# Patient Record
Sex: FEMALE | Race: WHITE | Employment: PART TIME | URBAN - METROPOLITAN AREA
[De-identification: names, ages, dates, MRNs, and addresses within clinical notes are randomized per-mention and may not be internally consistent; named-entity substitution may affect disease eponyms.]

---

## 2017-07-07 LAB
ANTIBODY SCREEN, EXTERNAL: NEGATIVE
CHLAMYDIA, EXTERNAL: NEGATIVE
HBSAG, EXTERNAL: NEGATIVE
HCT, EXTERNAL: 36.3
HGB, EXTERNAL: 12.6
HIV, EXTERNAL: NEGATIVE
N. GONORRHEA, EXTERNAL: NEGATIVE
PLATELET CNT,   EXTERNAL: 261
RPR, EXTERNAL: NORMAL
RUBELLA, EXTERNAL: NORMAL
TSH SERPL-ACNC: 0.64 M[IU]/L
TYPE, ABO & RH, EXTERNAL: NORMAL

## 2017-09-07 ENCOUNTER — DOCUMENTATION ONLY (OUTPATIENT)
Dept: OBGYN CLINIC | Age: 22
End: 2017-09-07

## 2017-09-07 NOTE — PROGRESS NOTES
Prenatal records reviewed. LMP=4/26/17 -> ELVIA=1/31/18 **  US (7/7/17) 9+6 @ 10+2    H/o +chlamydia (2014) -> GC neg 7/7/17  Declined CF and aneuploidy screening/AFP  Per notes, pap done, no results included.

## 2017-09-08 ENCOUNTER — OFFICE VISIT (OUTPATIENT)
Dept: OBGYN CLINIC | Age: 22
End: 2017-09-08

## 2017-09-08 VITALS
HEIGHT: 66 IN | DIASTOLIC BLOOD PRESSURE: 73 MMHG | HEART RATE: 82 BPM | SYSTOLIC BLOOD PRESSURE: 109 MMHG | WEIGHT: 208 LBS | BODY MASS INDEX: 33.43 KG/M2

## 2017-09-08 DIAGNOSIS — Z34.82 ENCOUNTER FOR SUPERVISION OF OTHER NORMAL PREGNANCY IN SECOND TRIMESTER: Primary | ICD-10-CM

## 2017-09-08 PROBLEM — Z34.90 PREGNANCY: Status: ACTIVE | Noted: 2017-09-08

## 2017-09-08 LAB — URINALYSIS, EXTERNAL: NEGATIVE

## 2017-09-08 RX ORDER — PRENATAL VIT 96/IRON FUM/FOLIC 27MG-0.8MG
1 TABLET ORAL
COMMUNITY
End: 2018-07-09

## 2017-09-08 NOTE — PATIENT INSTRUCTIONS

## 2017-09-08 NOTE — PROGRESS NOTES
Current pregnancy history: (TRANSFER OB - NEW PATIENT)     Abdoulaye Dixon is a ,  19 y.o. female [facility] Patient's last menstrual period was 2017. Transfer OB @ 19wks. Moved back to [place] from [facility]. ELVIA=18 by LMP, confirmed by Calvin Stuart      Prenatal records reviewed.     LMP=17 -> ELVIA=18 **  US (17) 9+6 @ 10+2     H/o +chlamydia () -> GC neg 17  Declined CF and aneuploidy screening/AFP  Per notes, pap done, no results included. No urine culture results    Declined NT/QUAD/CF/Flu Vaccine. She presents for the evaluation of amenorrhea and a positive pregnancy test.    LMP history:  The date of her LMP is certain. Her last menstrual period was normal and lasted for 4 to 5 days. A urine pregnancy test was positive several months ago. She was not on the pill at conception. Based on her LMP, her EDC is 18. Her menstrual cycles are regular and occur approximately every 28 days  and range from 3 to 5 days. The last menses lasted 4-5 the usual number of days. FETAL SURVEY - FHT= 130  A SINGLE VIABLE IUP AT 19W2D [place] BY LMP. FETAL CARDIAC MOTION OBSERVED. FETAL ANATOMY VISUALIZED AND APPEARS WITHIN NORMAL LIMITS. NO ABNORMALITIES IDENTIFIED ON TODAYS EXAM.  ANATOMY NOT SEEN: 4CH, 3VV, RVOT, LVOT. FETAL POSITION IS BREECH AND PRONE. FOLLOW UP  NEEDED TO COMPLETE SURVEY. APPROPRIATE GROWTH MEASURED; SIZE = DATES. KULWANT, CERVIX AND PLACENTA APPEAR WITHIN NORMAL LIMITS. GENDER: NOT SEEN    Ultrasound data:  She had an  ultrasound done by the ultrasound tech w/previous OB in [place], which revealed a viable pro pregnancy, giving an EDC of 18. Pregnancy symptoms:    Since her LMP she has experienced  urinary frequency, breast tenderness, and nausea. She has not been vomiting over the last few weeks. Associated signs and symptoms which she denies: dysuria, discharge, vaginal bleeding.     She states she has gained weight: Approximately 2 pounds over the last few weeks. Baseline = 206    Relevant past pregnancy history:   She has the following pregnancy history: PRIMA    She has no history of  delivery. Relevant past medical history:(relevant to this pregnancy): noncontributory. 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 Substance history: negative for alcohol, tobacco and street drugs. Positive for nothing. Exposure history: There are no indoor cats in the home. The patient was instructed to not change the cat litter. She denies close contact with children on a regular basis. She is unsure if she has had chicken pox or the vaccine in the past.   Patient denies issues with domestic violence. Genetic Screening/Teratology Counseling: (Includes patient, baby's father, or anyone in either family with:)  3.  Patient's age >/= 28 at [facility]?-- no  .   2. Thalassemia ([facility], [facility], [facility], or  background): MCV<80?--no.     3.  Neural tube defect (meningomyelocele, spina bifida, anencephaly)?--no.   4.  Congenital heart defect?--no.  5.  Down syndrome?--no.   6.  Jorge-Sachs (Hindu, [place])?--no.   7.  Canavan's Disease?--no.   8.  Familial Dysautonomia?--no.   9.  Sickle cell disease or trait ()? --no   The patient has not been tested for sickle trait  10. Hemophilia or other blood disorders?--no. 11.  Muscular dystrophy?--no. 12.  Cystic fibrosis?--no. 13.  Pinellas's Chorea?--no. 14.  Mental retardation/autism (if yes was person tested for Fragile X)?--no. 15.  Other inherited genetic or chromosomal disorder?--no. 12.  Maternal metabolic disorder (DM, PKU, etc)?--no. 17.  Patient or FOB with a child with a birth defect not listed above?--no.  17a. Patient or FOB with a birth defect themselves?--no. 18.  Recurrent pregnancy loss, or stillbirth?--no.   19. Any medications since LMP other than prenatal vitamins (include vitamins, supplements, OTC meds, drugs, alcohol)?--no. 20.  Any other genetic/environmental exposure to discuss?--no. Infection History:  1. Lives with someone with TB or TB exposed?--no.   2.  Patient or partner has history of genital herpes?--no.  3.  Rash or viral illness since LMP?--no.    4.  History of STD (GC, CT, HPV, syphilis, HIV)?-- h/o chlamydia (), neg 2017.  5. Other: OTHER? Past Medical History:   Diagnosis Date    Hx of chlamydia infection 2014    Routine Papanicolaou smear 2017    Pending report from previous OB-GYN     History reviewed. No pertinent surgical history. Social History     Occupational History    Not on file. Social History Main Topics    Smoking status: Never Smoker    Smokeless tobacco: Never Used      Comment: Never used vapor or e-cigs     Alcohol use No    Drug use: No    Sexual activity: Not Currently     Partners: Male     Birth control/ protection: None     Family History   Problem Relation Age of Onset    Hypertension Mother     Breast Cancer Maternal Grandmother 54    Cancer Maternal Grandmother      Colon    Breast Cancer Paternal Grandmother 72    Cancer Paternal Grandfather 79     Liver     OB History    Para Term  AB Living   1        SAB TAB Ectopic Molar Multiple Live Births              # Outcome Date [place] Lbr Matteo/2nd Weight Sex Delivery Anes PTL Lv   1 Current                 No Known Allergies  Prior to Admission medications    Medication Sig Start Date End Date Taking? Authorizing Provider   PRENATAL [id number]/KEIKO FUM/FOLIC (PRENATAL VITAMIN) 27 mg iron- 800 mcg tab tablet Take 1 Tab by mouth.    Yes Historical Provider        Review of Systems: History obtained from the patient  Constitutional: negative for weight loss, fever, night sweats  HEENT: negative for hearing loss, earache, congestion, snoring, sore throat  CV: negative for chest pain, palpitations, edema  Resp: negative for cough, shortness of breath, wheezing  Breast: negative for breast lumps, nipple discharge, galactorrhea  GI: negative for change in bowel habits, abdominal pain, black or bloody stools  : negative for frequency, dysuria, hematuria, vaginal discharge  MSK: negative for back pain, joint pain, muscle pain  Skin: negative for itching, rash, hives  Neuro: negative for dizziness, headache, confusion, weakness  Psych: negative for anxiety, depression, change in mood  Heme/lymph: negative for bleeding, bruising, pallor    Objective:  Visit Vitals    /73    Pulse 82    Ht 5' 6\" (1.676 m)    Wt 208 lb (94.3 kg)    LMP 04/26/2017    BMI 33.57 kg/m2       Physical Exam:     Constitutional  · Appearance: well-nourished, well developed, alert, in no acute distress    HENT  · Head  · Face: appears normal  · Eyes: appear normal  · Ears: normal  · Mouth: normal  · Lips: no lesions    Neck  · Inspection/Palpation: normal appearance, no masses or tenderness  · Lymph Nodes: no lymphadenopathy present  · Thyroid: gland size normal, nontender, no nodules or masses present on palpation    Chest  · Respiratory Effort: breathing unlabored  · Auscultation: normal breath sounds    Cardiovascular  · Heart:  · Auscultation: regular rate and rhythm without murmur    Breasts  · Inspection of Breasts: breasts symmetrical, no skin changes, no discharge present, nipple appearance normal, no skin retraction present  · Palpation of Breasts and Axillae: no masses present on palpation, no breast tenderness  · Axillary Lymph Nodes: no lymphadenopathy present    Gastrointestinal  · Abdominal Examination: abdomen non-tender to palpation, normal bowel sounds, no masses present  · Liver and spleen: no hepatomegaly present, spleen not palpable  · Hernias: no hernias identified    Genitourinary  · External Genitalia: normal appearance for age, no discharge present, no tenderness present, no inflammatory lesions present, no masses present, no atrophy present  · Vagina: normal vaginal vault without central or paravaginal defects, no discharge present, no inflammatory lesions present, no masses present  · Bladder: non-tender to palpation  · Urethra: appears normal  · Cervix: normal; lg/th/cl  · Uterus: gravid, FH @ U, soft, NT  · Adnexa: no adnexal tenderness present, no adnexal masses present  · Perineum: perineum within normal limits, no evidence of trauma, no rashes or skin lesions present  · Anus: anus within normal limits, no hemorrhoids present  · Inguinal Lymph Nodes: no lymphadenopathy present    Skin  · General Inspection: no rash, no lesions identified    Neurologic/Psychiatric  · Mental Status:  · Orientation: grossly oriented to person, place and time  · Mood and Affect: mood normal, affect appropriate    Assessment:   Intrauterine pregnancy:  - transfer OB @ 19wks  - ELVIA=1/31/18, confirmed by 10wk US  - declines CF, QS, QFP, aneuploidy screening  - varicella unknown. Declines titer today, will draw with 1hr/CBC  - will request pap results  - send UA/C&S today  - US today, cwd, heart not well seen. Rpt US with next visit      Plan:     · Offered CF testing, CVS, Nuchal Translucency, MSAFP, amnio, and discussed NIPT  · Course of pregnancy discussed including visit schedule, routine U/S, glucola testing, etc.  · Avoid alcoholic beverages and illicit/recreational drugs use  · Take prenatal vitamins or folic acid daily. · Hospital and practice style discussed with coverage system. · Discussed nutrition, toxoplasmosis precautions, sexual activity, exercise, need for influenza vaccine, environmental and work hazards, travel advice, screen for domestic violence, need for seat belts. · Discussed seafood, unpasteurized dairy products, deli meat, artificial sweeteners, and caffeine. · Information on prenatal classes/breastfeeding given. · Patient encouraged not to smoke.   · Discussed current prescription drug use. Given medication list.  · Discussed the use of over the counter medications and chemicals. · Pt understands risk of hemorrhage during pregnancy and post delivery and would accept blood products if necessary in life-threatening emergencies    Handouts given to pt.     Orders Placed This Encounter    CULTURE, URINE    URINALYSIS W/ RFLX MICROSCOPIC

## 2017-09-08 NOTE — MR AVS SNAPSHOT
Visit Information Date & Time Provider Department Dept. Phone Encounter #  
 9/8/2017 10:00  S Marija Mendiola, [address] [phone] [id number] Upcoming Health Maintenance Date Due  
 HPV AGE 9Y-34Y (1 of 3 - Female 3 Dose Series) 11/17/2004 PAP AKA CERVICAL CYTOLOGY 11/17/2014 INFLUENZA AGE 9 TO ADULT 8/1/2017 Allergies as of 9/8/2017  Review Complete On: 9/8/2017 By: Elgin Holliday No Known Allergies Current Immunizations  Never Reviewed No immunizations on file. Not reviewed this visit Vitals BP Pulse Height(growth percentile) Weight(growth percentile) LMP BMI  
 109/73 82 5' 6\" (1.676 m) 208 lb (94.3 kg) 04/26/2017 33.57 kg/m2 OB Status Smoking Status Pregnant Never Smoker Vitals History BMI and BSA Data Body Mass Index Body Surface Area  
 33.57 kg/m 2 2.1 m 2 Your Updated Medication List  
  
   
This list is accurate as of: 9/8/17 10:27 AM.  Always use your most recent med list.  
  
  
  
  
 prenatal vitamin 27 mg iron- 800 mcg Tab tablet Take 1 Tab by mouth. Patient Instructions Weeks 18 to 22 of Your Pregnancy: Care Instructions Your Care Instructions Your baby is continuing to develop quickly. At this stage, babies can now suck their thumbs,  firmly with their hands, and open and close their eyelids. Sometime between 18 and 22 weeks, you will start to feel your baby move. At first, these small fetal movements feel like fluttering or \"butterflies. \" Some women say that they feel like gas bubbles. As the baby grows, these movements will become stronger. You may also notice that your baby kicks and hiccups. During this time, you may find that your nausea and fatigue are gone. Overall, you may feel better and have more energy than you did in your first trimester.  But you may also have new discomforts now, such as sleep problems or leg cramps. This care sheet can help you ease these discomforts. Follow-up care is a key part of your treatment and safety. Be sure to make and go to all appointments, and call your doctor if you are having problems. It's also a good idea to know your test results and keep a list of the medicines you take. How can you care for yourself at home? Ease sleep problems · Avoid caffeine in drinks or chocolate late in the day. · Get some exercise every day. · Take a warm shower or bath before bed. · Have a light snack or glass of milk at bedtime. · Do relaxation exercises in bed to calm your mind and body. · Support your legs and back with extra pillows. Try a pillow between your legs if you sleep on your side. · Do not use sleeping pills or alcohol. They could harm your baby. Ease leg cramps · Do not massage your calf during the cramp. · Sit on a firm bed or chair. Straighten your leg, and bend your foot (flex your ankle) slowly upward, toward your knee. Bend your toes up and down. · Stand on a cool, flat surface. Stretch your toes upward, and take small steps walking on your heels. · Use a heating pad or hot water bottle to help with muscle ache. Prevent leg cramps · Be sure to get enough calcium. If you are worried that you are not getting enough, talk to your doctor. · Exercise every day, and stretch your legs before bed. · Take a warm bath before bed, and try leg warmers at night. Where can you learn more? Go to http://ricci-so.info/. Enter M043 in the search box to learn more about \"Weeks 18 to 22 of Your Pregnancy: Care Instructions. \" Current as of: March 16, 2017 Content Version: 11.3 © 0816-3099 [employer] Institute. Care instructions adapted under license by [employer] (which disclaims liability or warranty for this information).  If you have questions about a medical condition or this instruction, always ask your healthcare professional. [address] any warranty or liability for your use of this information. Introducing \[facility] [place] [facility]\"" & [facility] SERVICES! Lex Walker introduces Rapid7 patient portal. Now you can access parts of your medical record, email your doctor's office, and request medication refills online. 1. In your internet browser, go to [email or web address] 2. Click on the First Time User? Click Here link in the Sign In box. You will see the New Member Sign Up page. 3. Enter your Rapid7 Access Code exactly as it appears below. You will not need to use this code after youve completed the sign-up process. If you do not sign up before the expiration date, you must request a new code. · Rapid7 Access Code: [email or web address] Expires: 12/7/2017 10:27 AM 
 
4. Enter the last four digits of your Social Security Number (xxxx) and Date of Birth (mm/dd/yyyy) as indicated and click Submit. You will be taken to the next sign-up page. 5. Create a Rapid7 ID. This will be your Rapid7 login ID and cannot be changed, so think of one that is secure and easy to remember. 6. Create a Rapid7 password. You can change your password at any time. 7. Enter your Password Reset Question and Answer. This can be used at a later time if you forget your password. 8. Enter your e-mail address. You will receive e-mail notification when new information is available in [address] 9. Click Sign Up. You can now view and download portions of your medical record. 10. Click the Download Summary menu link to download a portable copy of your medical information. If you have questions, please visit the Frequently Asked Questions section of the Rapid7 website. Remember, Rapid7 is NOT to be used for urgent needs. For medical emergencies, dial 911. Now available from your iPhone and Android! Please provide this summary of care documentation to your next provider. If you have any questions after today's visit, please call [phone].

## 2017-09-09 LAB
APPEARANCE UR: ABNORMAL
BACTERIA #/AREA URNS HPF: ABNORMAL /[HPF]
BACTERIA UR CULT: NORMAL
BILIRUB UR QL STRIP: NEGATIVE
CASTS URNS QL MICRO: ABNORMAL /LPF
COLOR UR: YELLOW
EPI CELLS #/AREA URNS HPF: >10 /HPF
GLUCOSE UR QL: NEGATIVE
HGB UR QL STRIP: NEGATIVE
KETONES UR QL STRIP: ABNORMAL
LEUKOCYTE ESTERASE UR QL STRIP: ABNORMAL
MICRO URNS: ABNORMAL
MUCOUS THREADS URNS QL MICRO: PRESENT
NITRITE UR QL STRIP: NEGATIVE
PH UR STRIP: 6.5 [PH] (ref 5–7.5)
PROT UR QL STRIP: ABNORMAL
RBC #/AREA URNS HPF: ABNORMAL /HPF
SP GR UR: 1.03 (ref 1–1.03)
UROBILINOGEN UR STRIP-MCNC: 0.2 MG/DL (ref 0.2–1)
WBC #/AREA URNS HPF: ABNORMAL /HPF

## 2017-10-06 ENCOUNTER — ROUTINE PRENATAL (OUTPATIENT)
Dept: OBGYN CLINIC | Age: 22
End: 2017-10-06

## 2017-10-06 VITALS
BODY MASS INDEX: 34.39 KG/M2 | SYSTOLIC BLOOD PRESSURE: 113 MMHG | HEART RATE: 78 BPM | WEIGHT: 214 LBS | HEIGHT: 66 IN | DIASTOLIC BLOOD PRESSURE: 66 MMHG

## 2017-10-06 DIAGNOSIS — R80.9 PROTEINURIA, UNSPECIFIED TYPE: ICD-10-CM

## 2017-10-06 DIAGNOSIS — Z34.82 ENCOUNTER FOR SUPERVISION OF OTHER NORMAL PREGNANCY IN SECOND TRIMESTER: Primary | ICD-10-CM

## 2017-10-06 DIAGNOSIS — Z23 ENCOUNTER FOR IMMUNIZATION: ICD-10-CM

## 2017-10-06 NOTE — PROGRESS NOTES
LIMITED OB SCAN  A SINGLE BREECH 23W2D IUP IS SEEN. FETAL CARDIAC MOTION OBSERVED. LIMITED ANATOMY WAS VISUALIZED AND APPEARS WNL. FETAL CARDIAC ANATOMY WAS SEEN WELL  AND APPEARS WNL. FETAL SURVEY WAS COMPLETED. APPROPRIATE FETAL GROWTH IS SEEN. SIZE = DATES. KULWANT AND PLACENTA APPEAR WNL. Some sciatic pain-> tylenol, handout give, declines PT - will call if wants referral. +FM. US today wnl: 22+6 @ 23+2. 576gm. Nl morph. Post placenta. Flu vacc today. RTO 4wks with 1hr/CBC.

## 2017-10-06 NOTE — MR AVS SNAPSHOT
Visit Information Date & Time Provider Department Dept. Phone Encounter #  
 10/6/2017 10:00  S Marija Mendiola, [address] [phone] [id number] Your Appointments 10/6/2017 10:00 AM  
OB VISIT with [address], [place]  
[place] ([address]) Appt Note: US 1st - Incomplete FS - DM after (MG); Rpt UA  
 [id number] [facility] [address] [place] [address] 99 1000 [address]  
  
    
 11/3/2017 10:00 AM  
OB VISIT with [address], [place]  
[place] ([address]) Appt Note: 27w2d - 1hr GCT/CBC today and  Consent (MG)  
 [address]  
[phone] Upcoming Health Maintenance Date Due  
 HPV AGE 9Y-34Y (1 of 3 - Female 3 Dose Series) 2004 PAP AKA CERVICAL CYTOLOGY 2014 INFLUENZA AGE 9 TO ADULT 2017 Allergies as of 10/6/2017  Review Complete On: 10/6/2017 By: Patricia Hargrove No Known Allergies Current Immunizations  Never Reviewed No immunizations on file. Not reviewed this visit Vitals BP Pulse Height(growth percentile) Weight(growth percentile) LMP BMI  
 113/66 78 5' 6\" (1.676 m) 214 lb (97.1 kg) 2017 34.54 kg/m2 OB Status Smoking Status Pregnant Never Smoker BMI and BSA Data Body Mass Index Body Surface Area 34.54 kg/m 2 2.13 m 2 Your Updated Medication List  
  
   
This list is accurate as of: 10/6/17  9:59 AM.  Always use your most recent med list.  
  
  
  
  
 prenatal vitamin 27 mg iron- 800 mcg Tab tablet Take 1 Tab by mouth. Patient Instructions Weeks 22 to 26 of Your Pregnancy: Care Instructions Your Care Instructions As you enter your 7th month of pregnancy at week 26, your baby's lungs are growing stronger and getting ready to breathe.  You may notice that your baby responds to the sound of your or your partner's voice. You may also notice that your baby does less turning and twisting and more squirming or jerking. Jerking often means that your baby has the hiccups. Hiccups are perfectly normal and are only temporary. You may want to think about attending a childbirth preparation class. This is also a good time to start thinking about whether you want to have pain medicine during labor. Most pregnant women are tested for gestational diabetes between weeks 25 and 28. Gestational diabetes occurs when your blood sugar level gets too high when you're pregnant. The test is important, because you can have gestational diabetes and not know it. But the condition can cause problems for your baby. Follow-up care is a key part of your treatment and safety. Be sure to make and go to all appointments, and call your doctor if you are having problems. It's also a good idea to know your test results and keep a list of the medicines you take. How can you care for yourself at home? Ease discomfort from your baby's kicking · Change your position. Sometimes this will cause your baby to change position too. · Take a deep breath while you raise your arm over your head. Then breathe out while you drop your arm. Do Kegel exercises to prevent urine from leaking · You can do Kegel exercises while you stand or sit. ¨ Squeeze the same muscles you would use to stop your urine. Your belly and thighs should not move. ¨ Hold the squeeze for 3 seconds, and then relax for 3 seconds. ¨ Start with 3 seconds. Then add 1 second each week until you are able to squeeze for 10 seconds. ¨ Repeat the exercise 10 to 15 times for each session. Do three or more sessions each day. Ease or reduce swelling in your feet, ankles, hands, and fingers · If your fingers are puffy, take off your rings. · Do not eat high-salt foods, such as potato chips. · Prop up your feet on a stool or couch as much as possible. Sleep with pillows under your feet. · Do not stand for long periods of time or wear tight shoes. · Wear support stockings. Where can you learn more? Go to http://ricci-so.info/. Enter G264 in the search box to learn more about \"Weeks 22 to 26 of Your Pregnancy: Care Instructions. \" Current as of: March 16, 2017 Content Version: 11.3 © 0078-3194 [employer]. Care instructions adapted under license by [employer]54.[employer] (which disclaims liability or warranty for this information). If you have questions about a medical condition or this instruction, always ask your healthcare professional. [address] any warranty or liability for your use of this information. Introducing [facility] & HEALTH SERVICES! Lauar Pinedo introduces Snackr patient portal. Now you can access parts of your medical record, email your doctor's office, and request medication refills online. 1. In your internet browser, go to [email or web address] 2. Click on the First Time User? Click Here link in the Sign In box. You will see the New Member Sign Up page. 3. Enter your Snackr Access Code exactly as it appears below. You will not need to use this code after youve completed the sign-up process. If you do not sign up before the expiration date, you must request a new code. · Snackr Access Code: [email or web address] Expires: 12/7/2017 10:27 AM 
 
4. Enter the last four digits of your Social Security Number (xxxx) and Date of Birth (mm/dd/yyyy) as indicated and click Submit. You will be taken to the next sign-up page. 5. Create a Snackr ID. This will be your Snackr login ID and cannot be changed, so think of one that is secure and easy to remember. 6. Create a Snackr password. You can change your password at any time. 7. Enter your Password Reset Question and Answer.  This can be used at a later time if you forget your password. 8. Enter your e-mail address. You will receive e-mail notification when new information is available in [address] 9. Click Sign Up. You can now view and download portions of your medical record. 10. Click the Download Summary menu link to download a portable copy of your medical information. If you have questions, please visit the Frequently Asked Questions section of the Triage website. Remember, Triage is NOT to be used for urgent needs. For medical emergencies, dial 911. Now available from your iPhone and Android! Please provide this summary of care documentation to your next provider. If you have any questions after today's visit, please call [phone].

## 2017-10-06 NOTE — PATIENT INSTRUCTIONS
Weeks 22 to 26 of Your Pregnancy: Care Instructions  Your Care Instructions    As you enter your 7th month of pregnancy at week 26, your baby's lungs are growing stronger and getting ready to breathe. You may notice that your baby responds to the sound of your or your partner's voice. You may also notice that your baby does less turning and twisting and more squirming or jerking. Jerking often means that your baby has the hiccups. Hiccups are perfectly normal and are only temporary. You may want to think about attending a childbirth preparation class. This is also a good time to start thinking about whether you want to have pain medicine during labor. Most pregnant women are tested for gestational diabetes between weeks 25 and 28. Gestational diabetes occurs when your blood sugar level gets too high when you're pregnant. The test is important, because you can have gestational diabetes and not know it. But the condition can cause problems for your baby. Follow-up care is a key part of your treatment and safety. Be sure to make and go to all appointments, and call your doctor if you are having problems. It's also a good idea to know your test results and keep a list of the medicines you take. How can you care for yourself at home? Ease discomfort from your baby's kicking  · Change your position. Sometimes this will cause your baby to change position too. · Take a deep breath while you raise your arm over your head. Then breathe out while you drop your arm. Do Kegel exercises to prevent urine from leaking  · You can do Kegel exercises while you stand or sit. ¨ Squeeze the same muscles you would use to stop your urine. Your belly and thighs should not move. ¨ Hold the squeeze for 3 seconds, and then relax for 3 seconds. ¨ Start with 3 seconds. Then add 1 second each week until you are able to squeeze for 10 seconds. ¨ Repeat the exercise 10 to 15 times for each session.  Do three or more sessions each day.  Ease or reduce swelling in your feet, ankles, hands, and fingers  · If your fingers are puffy, take off your rings. · Do not eat high-salt foods, such as potato chips. · Prop up your feet on a stool or couch as much as possible. Sleep with pillows under your feet. · Do not stand for long periods of time or wear tight shoes. · Wear support stockings. Where can you learn more? Go to http://ricci-so.info/. Enter G264 in the search box to learn more about \"Weeks 22 to 26 of Your Pregnancy: Care Instructions. \"  Current as of: March 16, 2017  Content Version: 11.3  © 0101-8269 [employer]. Care instructions adapted under license by [employer] (which disclaims liability or warranty for this information). If you have questions about a medical condition or this instruction, always ask your healthcare professional. [place] [address] any warranty or liability for your use of this information.

## 2017-10-07 LAB
APPEARANCE UR: ABNORMAL
BACTERIA #/AREA URNS HPF: NORMAL /[HPF]
BILIRUB UR QL STRIP: NEGATIVE
CASTS URNS QL MICRO: NORMAL /LPF
COLOR UR: YELLOW
EPI CELLS #/AREA URNS HPF: NORMAL /HPF
GLUCOSE UR QL: NEGATIVE
HGB UR QL STRIP: NEGATIVE
KETONES UR QL STRIP: NEGATIVE
LEUKOCYTE ESTERASE UR QL STRIP: ABNORMAL
MICRO URNS: ABNORMAL
MUCOUS THREADS URNS QL MICRO: PRESENT
NITRITE UR QL STRIP: NEGATIVE
PH UR STRIP: 7.5 [PH] (ref 5–7.5)
PROT UR QL STRIP: NEGATIVE
RBC #/AREA URNS HPF: NORMAL /HPF
SP GR UR: 1.01 (ref 1–1.03)
UROBILINOGEN UR STRIP-MCNC: 0.2 MG/DL (ref 0.2–1)
WBC #/AREA URNS HPF: NORMAL /HPF

## 2017-11-03 ENCOUNTER — ROUTINE PRENATAL (OUTPATIENT)
Dept: OBGYN CLINIC | Age: 22
End: 2017-11-03

## 2017-11-03 VITALS — SYSTOLIC BLOOD PRESSURE: 110 MMHG | WEIGHT: 217 LBS | BODY MASS INDEX: 35.02 KG/M2 | DIASTOLIC BLOOD PRESSURE: 63 MMHG

## 2017-11-03 DIAGNOSIS — Z34.02 ENCOUNTER FOR SUPERVISION OF NORMAL FIRST PREGNANCY IN SECOND TRIMESTER: Primary | ICD-10-CM

## 2017-11-03 LAB
GTT, 1 HR, GLUCOLA, EXTERNAL: 67
HCT, EXTERNAL: 32.9
HGB, EXTERNAL: 11.4
PLATELET CNT,   EXTERNAL: 232
VARICELLA, EXTERNAL: NORMAL

## 2017-11-03 NOTE — PATIENT INSTRUCTIONS
Weeks 26 to 30 of Your Pregnancy: Care Instructions  Your Care Instructions    You are now in your last trimester of pregnancy. Your baby is growing rapidly. And you'll probably feel your baby moving around more often. Your doctor may ask you to count your baby's kicks. Your back may ache as your body gets used to your baby's size and length. If you haven't already had the Tdap shot during this pregnancy, talk to your doctor about getting it. It will help protect your  against pertussis infection. During this time, it's important to take care of yourself and pay attention to what your body needs. If you feel sexual, explore ways to be close with your partner that match your comfort and desire. Use the tips provided in this care sheet to find ways to be sexual in your own way. Follow-up care is a key part of your treatment and safety. Be sure to make and go to all appointments, and call your doctor if you are having problems. It's also a good idea to know your test results and keep a list of the medicines you take. How can you care for yourself at home? Take it easy at work  · Take frequent breaks. If possible, stop working when you are tired, and rest during your lunch hour. · Take bathroom breaks every 2 hours. · Change positions often. If you sit for long periods, stand up and walk around. · When you stand for a long time, keep one foot on a low stool with your knee bent. After standing a lot, sit with your feet up. · Avoid fumes, chemicals, and tobacco smoke. Be sexual in your own way  · Having sex during pregnancy is okay, unless your doctor tells you not to. · You may be very interested in sex, or you may have no interest at all. · Your growing belly can make it hard to find a good position during intercourse. [place] and explore. · You may get cramps in your uterus when your partner touches your breasts.   · A back rub may relieve the backache or cramps that sometimes follow orgasm. Learn about  labor  · Watch for signs of  labor. You may be going into labor if:  ¨ You have menstrual-like cramps, with or without nausea. ¨ You have about 4 or more contractions in 20 minutes, or about 8 or more within 1 hour, even after you have had a glass of water and are resting. ¨ You have a low, dull backache that does not go away when you change your position. ¨ You have pain or pressure in your pelvis that comes and goes in a pattern. ¨ You have intestinal cramping or flu-like symptoms, with or without diarrhea. ¨ You notice an increase or change in your vaginal discharge. Discharge may be heavy, mucus-like, watery, or streaked with blood. ¨ Your water breaks. · If you think you have  labor:  ¨ Drink 2 or 3 glasses of water or juice. Not drinking enough fluids can cause contractions. ¨ Stop what you are doing, and empty your bladder. Then lie down on your left side for at least 1 hour. ¨ While lying on your side, find your breast bone. Put your fingers in the soft spot just below it. Move your fingers down toward your belly button to find the top of your uterus. Check to see if it is tight. ¨ Contractions can be weak or strong. Record your contractions for an hour. Time a contraction from the start of one contraction to the start of the next one. ¨ Single or several strong contractions without a pattern are called Demond-Starr contractions. They are practice contractions but not the start of labor. They often stop if you change what you are doing. ¨ Call your doctor if you have regular contractions. Where can you learn more? Go to http://ricci-so.info/. Enter L645 in the search box to learn more about \"Weeks 26 to 30 of Your Pregnancy: Care Instructions. \"  Current as of: 2017  Content Version: 11.4  © 0345-5195 [employer].  Care instructions adapted under license by [employer] (which disclaims liability or warranty for this information). If you have questions about a medical condition or this instruction, always ask your healthcare professional. [place] [address] any warranty or liability for your use of this information.

## 2017-11-03 NOTE — MR AVS SNAPSHOT
Visit Information Date & Time Provider Department Dept. Phone Encounter #  
 11/3/2017 10:00 AM [address], Dorota Alston [facility] [phone] [id number] Your Appointments 11/22/2017  9:40 AM  
OB VISIT with [address], MD  
Nikolai Gorman ([facility]) Appt Note: 30w0d (MG)  
 [id number] [facility] [address] [address]  
[address] [facility] Upcoming Health Maintenance Date Due  
 HPV AGE 9Y-34Y (1 of 3 - Female 3 Dose Series) 11/17/2004 PAP AKA CERVICAL CYTOLOGY 11/17/2014 OB 3RD TRIMESTER TDAP 11/1/2017 Allergies as of 11/3/2017  Review Complete On: 11/3/2017 By: Pieter Denney RN No Known Allergies Current Immunizations  Never Reviewed Name Date Influenza Vaccine (Quad) PF 10/6/2017 Not reviewed this visit You Were Diagnosed With   
  
 Codes Comments Encounter for supervision of normal first pregnancy in second trimester    -  Primary ICD-10-CM: Z34.02 
ICD-9-CM: V22.0 Vitals BP Weight(growth percentile) LMP BMI OB Status Smoking Status 110/63 217 lb (98.4 kg) 04/26/2017 35.02 kg/m2 Pregnant Never Smoker BMI and BSA Data Body Mass Index Body Surface Area 35.02 kg/m 2 2.14 m 2 Your Updated Medication List  
  
   
This list is accurate as of: 11/3/17 10:22 AM.  Always use your most recent med list.  
  
  
  
  
 prenatal vitamin 27 mg iron- 800 mcg Tab tablet Take 1 Tab by mouth. We Performed the Following CBC W/O DIFF [75939 CPT(R)] GLUCOSE, GESTATIONAL 1 HR TOLERANCE [21413 CPT(R)] Patient Instructions Weeks 26 to 30 of Your Pregnancy: Care Instructions Your Care Instructions You are now in your last trimester of pregnancy. Your baby is growing rapidly. And you'll probably feel your baby moving around more often.  Your doctor may ask you to count your baby's kicks. Your back may ache as your body gets used to your baby's size and length. If you haven't already had the Tdap shot during this pregnancy, talk to your doctor about getting it. It will help protect your  against pertussis infection. During this time, it's important to take care of yourself and pay attention to what your body needs. If you feel sexual, explore ways to be close with your partner that match your comfort and desire. Use the tips provided in this care sheet to find ways to be sexual in your own way. Follow-up care is a key part of your treatment and safety. Be sure to make and go to all appointments, and call your doctor if you are having problems. It's also a good idea to know your test results and keep a list of the medicines you take. How can you care for yourself at home? Take it easy at work · Take frequent breaks. If possible, stop working when you are tired, and rest during your lunch hour. · Take bathroom breaks every 2 hours. · Change positions often. If you sit for long periods, stand up and walk around. · When you stand for a long time, keep one foot on a low stool with your knee bent. After standing a lot, sit with your feet up. · Avoid fumes, chemicals, and tobacco smoke. Be sexual in your own way · Having sex during pregnancy is okay, unless your doctor tells you not to. · You may be very interested in sex, or you may have no interest at all. · Your growing belly can make it hard to find a good position during intercourse. [place] and explore. · You may get cramps in your uterus when your partner touches your breasts. · A back rub may relieve the backache or cramps that sometimes follow orgasm. Learn about  labor · Watch for signs of  labor. You may be going into labor if: 
¨ You have menstrual-like cramps, with or without nausea. ¨ You have about 4 or more contractions in 20 minutes, or about 8 or more within 1 hour, even after you have had a glass of water and are resting. ¨ You have a low, dull backache that does not go away when you change your position. ¨ You have pain or pressure in your pelvis that comes and goes in a pattern. ¨ You have intestinal cramping or flu-like symptoms, with or without diarrhea. ¨ You notice an increase or change in your vaginal discharge. Discharge may be heavy, mucus-like, watery, or streaked with blood. ¨ Your water breaks. · If you think you have  labor: ¨ Drink 2 or 3 glasses of water or juice. Not drinking enough fluids can cause contractions. ¨ Stop what you are doing, and empty your bladder. Then lie down on your left side for at least 1 hour. ¨ While lying on your side, find your breast bone. Put your fingers in the soft spot just below it. Move your fingers down toward your belly button to find the top of your uterus. Check to see if it is tight. ¨ Contractions can be weak or strong. Record your contractions for an hour. Time a contraction from the start of one contraction to the start of the next one. ¨ Single or several strong contractions without a pattern are called Republic-Starr contractions. They are practice contractions but not the start of labor. They often stop if you change what you are doing. ¨ Call your doctor if you have regular contractions. Where can you learn more? Go to http://ricci-so.info/. Enter W481 in the search box to learn more about \"Weeks 26 to 30 of Your Pregnancy: Care Instructions. \" Current as of: 2017 Content Version: 11.4 © 1682-6619 [employer]insay. Care instructions adapted under license by [employer] (which disclaims liability or warranty for this information).  If you have questions about a medical condition or this instruction, always ask your healthcare professional. [employer] disclaims any warranty or liability for your use of this information. Introducing [facility] [place] [facility]! Dawna Miller introduces Webjam patient portal. Now you can access parts of your medical record, email your doctor's office, and request medication refills online. 1. In your internet browser, go to [email or web address] 2. Click on the First Time User? Click Here link in the Sign In box. You will see the New Member Sign Up page. 3. Enter your Webjam Access Code exactly as it appears below. You will not need to use this code after youve completed the sign-up process. If you do not sign up before the expiration date, you must request a new code. · Webjam Access Code: [email or web address] Expires: 12/7/2017 10:27 AM 
 
4. Enter the last four digits of your Social Security Number (xxxx) and Date of Birth (mm/dd/yyyy) as indicated and click Submit. You will be taken to the next sign-up page. 5. Create a Webjam ID. This will be your Webjam login ID and cannot be changed, so think of one that is secure and easy to remember. 6. Create a Webjam password. You can change your password at any time. 7. Enter your Password Reset Question and Answer. This can be used at a later time if you forget your password. 8. Enter your e-mail address. You will receive e-mail notification when new information is available in [address] 9. Click Sign Up. You can now view and download portions of your medical record. 10. Click the Download Summary menu link to download a portable copy of your medical information. If you have questions, please visit the Frequently Asked Questions section of the Webjam website. Remember, Webjam is NOT to be used for urgent needs. For medical emergencies, dial 911. Now available from your iPhone and Android! Please provide this summary of care documentation to your next provider. If you have any questions after today's visit, please call [phone].

## 2017-11-04 LAB
ERYTHROCYTE [DISTWIDTH] IN BLOOD BY AUTOMATED COUNT: 13.3 % (ref 12.3–15.4)
GLUCOSE 1H P 50 G GLC PO SERPL-MCNC: 67 MG/DL (ref 65–139)
HCT VFR BLD AUTO: 32.9 % (ref 34–46.6)
HGB BLD-MCNC: 11.4 G/DL (ref 11.1–15.9)
MCH RBC QN AUTO: 30.6 PG (ref 26.6–33)
MCHC RBC AUTO-ENTMCNC: 34.7 G/DL (ref 31.5–35.7)
MCV RBC AUTO: 88 FL (ref 79–97)
PLATELET # BLD AUTO: 232 X10E3/UL (ref 150–379)
RBC # BLD AUTO: 3.73 X10E6/UL (ref 3.77–5.28)
VZV IGG SER IA-ACNC: 3744 INDEX
WBC # BLD AUTO: 10.2 X10E3/UL (ref 3.4–10.8)

## 2017-11-20 ENCOUNTER — ROUTINE PRENATAL (OUTPATIENT)
Dept: OBGYN CLINIC | Age: 22
End: 2017-11-20

## 2017-11-20 VITALS
WEIGHT: 220 LBS | HEIGHT: 66 IN | HEART RATE: 101 BPM | BODY MASS INDEX: 35.36 KG/M2 | SYSTOLIC BLOOD PRESSURE: 110 MMHG | DIASTOLIC BLOOD PRESSURE: 69 MMHG

## 2017-11-20 DIAGNOSIS — Z34.03 ENCOUNTER FOR SUPERVISION OF NORMAL FIRST PREGNANCY IN THIRD TRIMESTER: Primary | ICD-10-CM

## 2017-11-20 NOTE — PROGRESS NOTES
Hgb=11.4. 1hr=67.  US @34wks for growth. RTO 2wks. - transfer OB @ 19wks  - ELVIA=1/31/18, confirmed by Aravind Vicente  - declines CF, QS, NIPS  - varicella immune  - US today, cwd, heart not well seen. Rpt US with next visit  - US (9/8/17) 18+2 @ 19+2 (by Aravind Vicente). Post placenta. Heart NWS -> rpt 4wks. - US (10/6/17) 22+6 @ 23+2. 576gm. Nl morph. Post placenta.   - flu vacc (10/6)

## 2017-11-20 NOTE — PATIENT INSTRUCTIONS
Weeks 26 to 30 of Your Pregnancy: Care Instructions  Your Care Instructions    You are now in your last trimester of pregnancy. Your baby is growing rapidly. And you'll probably feel your baby moving around more often. Your doctor may ask you to count your baby's kicks. Your back may ache as your body gets used to your baby's size and length. If you haven't already had the Tdap shot during this pregnancy, talk to your doctor about getting it. It will help protect your  against pertussis infection. During this time, it's important to take care of yourself and pay attention to what your body needs. If you feel sexual, explore ways to be close with your partner that match your comfort and desire. Use the tips provided in this care sheet to find ways to be sexual in your own way. Follow-up care is a key part of your treatment and safety. Be sure to make and go to all appointments, and call your doctor if you are having problems. It's also a good idea to know your test results and keep a list of the medicines you take. How can you care for yourself at home? Take it easy at work  · Take frequent breaks. If possible, stop working when you are tired, and rest during your lunch hour. · Take bathroom breaks every 2 hours. · Change positions often. If you sit for long periods, stand up and walk around. · When you stand for a long time, keep one foot on a low stool with your knee bent. After standing a lot, sit with your feet up. · Avoid fumes, chemicals, and tobacco smoke. Be sexual in your own way  · Having sex during pregnancy is okay, unless your doctor tells you not to. · You may be very interested in sex, or you may have no interest at all. · Your growing belly can make it hard to find a good position during intercourse. [place] and explore. · You may get cramps in your uterus when your partner touches your breasts.   · A back rub may relieve the backache or cramps that sometimes follow orgasm. Learn about  labor  · Watch for signs of  labor. You may be going into labor if:  ¨ You have menstrual-like cramps, with or without nausea. ¨ You have about 4 or more contractions in 20 minutes, or about 8 or more within 1 hour, even after you have had a glass of water and are resting. ¨ You have a low, dull backache that does not go away when you change your position. ¨ You have pain or pressure in your pelvis that comes and goes in a pattern. ¨ You have intestinal cramping or flu-like symptoms, with or without diarrhea. ¨ You notice an increase or change in your vaginal discharge. Discharge may be heavy, mucus-like, watery, or streaked with blood. ¨ Your water breaks. · If you think you have  labor:  ¨ Drink 2 or 3 glasses of water or juice. Not drinking enough fluids can cause contractions. ¨ Stop what you are doing, and empty your bladder. Then lie down on your left side for at least 1 hour. ¨ While lying on your side, find your breast bone. Put your fingers in the soft spot just below it. Move your fingers down toward your belly button to find the top of your uterus. Check to see if it is tight. ¨ Contractions can be weak or strong. Record your contractions for an hour. Time a contraction from the start of one contraction to the start of the next one. ¨ Single or several strong contractions without a pattern are called Demond-Starr contractions. They are practice contractions but not the start of labor. They often stop if you change what you are doing. ¨ Call your doctor if you have regular contractions. Where can you learn more? Go to http://ricci-so.info/. Enter I033 in the search box to learn more about \"Weeks 26 to 30 of Your Pregnancy: Care Instructions. \"  Current as of: 2017  Content Version: 11.4  © 6880-9652 [employer].  Care instructions adapted under license by [employer] (which disclaims liability or warranty for this information). If you have questions about a medical condition or this instruction, always ask your healthcare professional. [place] [address] any warranty or liability for your use of this information.

## 2017-12-08 ENCOUNTER — ROUTINE PRENATAL (OUTPATIENT)
Dept: OBGYN CLINIC | Age: 22
End: 2017-12-08

## 2017-12-08 VITALS
WEIGHT: 220 LBS | SYSTOLIC BLOOD PRESSURE: 114 MMHG | BODY MASS INDEX: 35.36 KG/M2 | HEIGHT: 66 IN | DIASTOLIC BLOOD PRESSURE: 65 MMHG | HEART RATE: 91 BPM

## 2017-12-08 DIAGNOSIS — Z23 ENCOUNTER FOR IMMUNIZATION: ICD-10-CM

## 2017-12-08 DIAGNOSIS — Z34.03 ENCOUNTER FOR SUPERVISION OF NORMAL FIRST PREGNANCY IN THIRD TRIMESTER: Primary | ICD-10-CM

## 2017-12-08 NOTE — PATIENT INSTRUCTIONS
Access MediQuip Desk: [phone]       Weeks 32 to 29 of Your Pregnancy: Care Instructions  Your Care Instructions    During the last few weeks of your pregnancy, you may have more aches and pains. It's important to rest when you can. Your growing baby is putting more pressure on your bladder. So you may need to urinate more often. Hemorrhoids are also common. These are painful, itchy veins in the rectal area. In the 36th week, most women have a test for group B streptococcus (GBS). GBS is a common bacteria that can live in the vagina and rectum. It can make your baby sick after birth. If you test positive, you will get antibiotics during labor. These will keep your baby from getting the bacteria. You may want to talk with your doctor about banking your baby's umbilical cord blood. This is the blood left in the cord after birth. If you want to save this blood, you must arrange it ahead of time. You can't decide at the last minute. If you haven't already had the Tdap shot during this pregnancy, talk to your doctor about getting it. It will help protect your  against pertussis infection. Follow-up care is a key part of your treatment and safety. Be sure to make and go to all appointments, and call your doctor if you are having problems. It's also a good idea to know your test results and keep a list of the medicines you take. How can you care for yourself at home? Ease hemorrhoids  · Get more liquids, fruits, vegetables, and fiber in your diet. This will help keep your stools soft. · Avoid sitting for too long. Lie on your left side several times a day. · Clean yourself with soft, moist toilet paper. Or you can use witch hazel pads or personal hygiene pads. · If you are uncomfortable, try ice packs. Or you can sit in a warm sitz bath. Do these for 20 minutes at a time, as needed. · Use hydrocortisone cream for pain and itching. Two examples are Anusol and Preparation H Hydrocortisone.   · Ask your doctor about taking an over-the-counter stool softener. Consider breastfeeding  · Experts recommend that women breastfeed for 1 year or longer. Breast milk is the perfect food for babies. · Breast milk is easier for babies to digest than formula. And it is always available, just the right temperature, and free. · In general, babies who are  are healthier than formula-fed babies. ¨  babies are less likely to get ear infections, colds, diarrhea, and pneumonia. ¨  babies who are fed only breast milk are less likely to get asthma and allergies. ¨  babies are less likely to be obese. ¨  babies are less likely to get diabetes or heart disease. · Women who breastfeed have less bleeding after the birth. Their uteruses also shrink back faster. · Some women who breastfeed lose weight faster. Making milk burns calories. · Breastfeeding can lower your risk of breast cancer, ovarian cancer, and osteoporosis. Decide about circumcision for boys  · As you make this decision, it may help to think about your personal, Hinduism, and family traditions. You get to decide if you will keep your son's penis natural or if he will be circumcised. · If you decide that you would like to have your baby circumcised, talk with your doctor. You can share your concerns about pain. And you can discuss your preferences for anesthesia. Where can you learn more? Go to http://ricci-so.info/. Enter T340 in the search box to learn more about \"Weeks 32 to 34 of Your Pregnancy: Care Instructions. \"  Current as of: March 16, 2017  Content Version: 11.4  © 7813-0606 Healthwise, [employer]. Care instructions adapted under license by [employer] (which disclaims liability or warranty for this information).  If you have questions about a medical condition or this instruction, always ask your healthcare professional. Teddy Gibbons disclaims any warranty or liability for your use of this information.

## 2017-12-08 NOTE — PROGRESS NOTES
Patient is requesting Tdap to be given. Injected 0.5 ml in left deltoid Tdap. Lot number [id number]  Exp 12/02/19- given without complication, patient tolerated well.

## 2017-12-08 NOTE — MR AVS SNAPSHOT
Visit Information Date & Time Provider Department Dept. Phone Encounter #  
 12/8/2017 11:30  S Marija Mendiola, Dorota Bianchi [phone] [id number] Upcoming Health Maintenance Date Due  
 HPV AGE 9Y-34Y (1 of 3 - Female 3 Dose Series) 11/17/2004 PAP AKA CERVICAL CYTOLOGY 11/17/2014 OB 3RD TRIMESTER TDAP 11/1/2017 Allergies as of 12/8/2017  Review Complete On: 12/8/2017 By: Shane Owusu No Known Allergies Current Immunizations  Never Reviewed Name Date Influenza Vaccine (Quad) PF 10/6/2017 Not reviewed this visit Vitals BP Pulse Height(growth percentile) Weight(growth percentile) LMP BMI  
 114/65 91 5' 6\" (1.676 m) 220 lb (99.8 kg) 04/26/2017 35.51 kg/m2 OB Status Smoking Status Pregnant Never Smoker BMI and BSA Data Body Mass Index Body Surface Area 35.51 kg/m 2 2.16 m 2 Your Updated Medication List  
  
   
This list is accurate as of: 12/8/17 11:32 AM.  Always use your most recent med list.  
  
  
  
  
 prenatal vitamin 27 mg iron- 800 mcg Tab tablet Take 1 Tab by mouth. [facility] Instructions [facility]: [phone] Weeks 32 to 34 of Your Pregnancy: Care Instructions Your Care Instructions During the last few weeks of your pregnancy, you may have more aches and pains. It's important to rest when you can. Your growing baby is putting more pressure on your bladder. So you may need to urinate more often. Hemorrhoids are also common. These are painful, itchy veins in the rectal area. In the 36th week, most women have a test for group B streptococcus (GBS). GBS is a common bacteria that can live in the vagina and rectum. It can make your baby sick after birth. If you test positive, you will get antibiotics during labor. These will keep your baby from getting the bacteria.  
You may want to talk with your doctor about banking your baby's umbilical cord blood. This is the blood left in the cord after birth. If you want to save this blood, you must arrange it ahead of time. You can't decide at the last minute. If you haven't already had the Tdap shot during this pregnancy, talk to your doctor about getting it. It will help protect your  against pertussis infection. Follow-up care is a key part of your treatment and safety. Be sure to make and go to all appointments, and call your doctor if you are having problems. It's also a good idea to know your test results and keep a list of the medicines you take. How can you care for yourself at home? Ease hemorrhoids · Get more liquids, fruits, vegetables, and fiber in your diet. This will help keep your stools soft. · Avoid sitting for too long. Lie on your left side several times a day. · Clean yourself with soft, moist toilet paper. Or you can use witch hazel pads or personal hygiene pads. · If you are uncomfortable, try ice packs. Or you can sit in a warm sitz bath. Do these for 20 minutes at a time, as needed. · Use hydrocortisone cream for pain and itching. Two examples are Anusol and Preparation H Hydrocortisone. · Ask your doctor about taking an over-the-counter stool softener. Consider breastfeeding · Experts recommend that women breastfeed for 1 year or longer. Breast milk is the perfect food for babies. · Breast milk is easier for babies to digest than formula. And it is always available, just the right temperature, and free. · In general, babies who are  are healthier than formula-fed babies. ¨  babies are less likely to get ear infections, colds, diarrhea, and pneumonia. ¨  babies who are fed only breast milk are less likely to get asthma and allergies. ¨  babies are less likely to be obese. ¨  babies are less likely to get diabetes or heart disease. · Women who breastfeed have less bleeding after the birth.  Their uteruses also shrink back faster. · Some women who breastfeed lose weight faster. Making milk burns calories. · Breastfeeding can lower your risk of breast cancer, ovarian cancer, and osteoporosis. Decide about circumcision for boys · As you make this decision, it may help to think about your personal, Caodaism, and family traditions. You get to decide if you will keep your son's penis natural or if he will be circumcised. · If you decide that you would like to have your baby circumcised, talk with your doctor. You can share your concerns about pain. And you can discuss your preferences for anesthesia. Where can you learn more? Go to http://ricci-so.info/. Enter P190 in the search box to learn more about \"Weeks 32 to 34 of Your Pregnancy: Care Instructions. \" Current as of: March 16, 2017 Content Version: 11.4 © 1991-9543 "[employer]. Care instructions adapted under license by [employer] (which disclaims liability or warranty for this information). If you have questions about a medical condition or this instruction, always ask your healthcare professional. [place] [address] any warranty or liability for your use of this information. Introducing [facility]! [facility] introduces Medallia patient portal. Now you can access parts of your medical record, email your doctor's office, and request medication refills online. 1. In your internet browser, go to [email or web address] 2. Click on the First Time User? Click Here link in the Sign In box. You will see the New Member Sign Up page. 3. Enter your Medallia Access Code exactly as it appears below. You will not need to use this code after youve completed the sign-up process. If you do not sign up before the expiration date, you must request a new code. · Medallia Access Code: [email or web address] Expires: 3/8/2018 11:32 AM 
 
 4. Enter the last four digits of your Social Security Number (xxxx) and Date of Birth (mm/dd/yyyy) as indicated and click Submit. You will be taken to the next sign-up page. 5. Create a Sensr.net ID. This will be your Sensr.net login ID and cannot be changed, so think of one that is secure and easy to remember. 6. Create a Sensr.net password. You can change your password at any time. 7. Enter your Password Reset Question and Answer. This can be used at a later time if you forget your password. 8. Enter your e-mail address. You will receive e-mail notification when new information is available in [address] 9. Click Sign Up. You can now view and download portions of your medical record. 10. Click the Download Summary menu link to download a portable copy of your medical information. If you have questions, please visit the Frequently Asked Questions section of the [email or web address] website. Remember, [email or web address] is NOT to be used for urgent needs. For medical emergencies, dial 911. Now available from your iPhone and Android! Please provide this summary of care documentation to your next provider. If you have any questions after today's visit, please call [phone].

## 2017-12-08 NOTE — PROGRESS NOTES
LIMITED OB SCAN  A SINGLE VERTEX 32W2D IUP IS SEEN. FETAL CARDIAC MOTION OBSERVED. LIMITED ANATOMY WAS VISUALIZED AND APPEARS WNL. APPROPRIATE FETAL GROWTH IS SEEN. SIZE = DATES. PLACENTA APPEAR WNL. SAW APPEARS TO BE AT THE UPPER LIMITS OF NORMAL AND MEASURES 23.9CM.      +FM. No VB/LOF. US today wnl, AGA: 34+0 @ 32+2. 2245gm (67.5%). KULWANT=23.9. Occ SOB (physiologic w/ pregnancy). TDAP today. RTO 2wks. - transfer OB @ 19wks  - ELVIA=1/31/18, confirmed by Beena Castellanos  - declines CF, QS, NIPS  - varicella immune  - US today, cwd, heart not well seen. Rpt US with next visit  - US (9/8/17) 18+2 @ 19+2 (by Beena Castellanos). Post placenta. Heart NWS -> rpt 4wks. - US (10/6/17) 22+6 @ 23+2. 576gm. Nl morph. Post placenta. - US (12/8/17) 34+0 @ 32+2. 2245gm (67.5%). KULWANT=23.9.  - flu v/acc (10/6).  TDAP (12/8)

## 2017-12-21 ENCOUNTER — ROUTINE PRENATAL (OUTPATIENT)
Dept: OBGYN CLINIC | Age: 22
End: 2017-12-21

## 2017-12-21 VITALS
WEIGHT: 222 LBS | HEIGHT: 66 IN | SYSTOLIC BLOOD PRESSURE: 94 MMHG | DIASTOLIC BLOOD PRESSURE: 66 MMHG | BODY MASS INDEX: 35.68 KG/M2 | HEART RATE: 107 BPM

## 2017-12-21 DIAGNOSIS — Z34.03 ENCOUNTER FOR SUPERVISION OF NORMAL FIRST PREGNANCY IN THIRD TRIMESTER: Primary | ICD-10-CM

## 2017-12-21 NOTE — PATIENT INSTRUCTIONS

## 2018-01-04 ENCOUNTER — ROUTINE PRENATAL (OUTPATIENT)
Dept: OBGYN CLINIC | Age: 23
End: 2018-01-04

## 2018-01-04 VITALS
HEART RATE: 97 BPM | WEIGHT: 226 LBS | DIASTOLIC BLOOD PRESSURE: 61 MMHG | SYSTOLIC BLOOD PRESSURE: 98 MMHG | RESPIRATION RATE: 19 BRPM | BODY MASS INDEX: 36.32 KG/M2 | HEIGHT: 66 IN

## 2018-01-04 DIAGNOSIS — Z34.03 ENCOUNTER FOR SUPERVISION OF NORMAL FIRST PREGNANCY IN THIRD TRIMESTER: Primary | ICD-10-CM

## 2018-01-04 DIAGNOSIS — O26.843 UTERINE SIZE-DATE DISCREPANCY IN THIRD TRIMESTER: ICD-10-CM

## 2018-01-04 LAB — GRBS, EXTERNAL: NEGATIVE

## 2018-01-04 NOTE — PROGRESS NOTES
+FM.  No VB/LOF/reg ctx. No c/o. GBS today. RTO 1wk with US for growth. - transfer OB @ 19wks  - ELVIA=1/31/18, confirmed by Osmar Alan  - declines CF, QS, NIPS  - varicella immune  - US today, cwd, heart not well seen. Rpt US with next visit  - US (9/8/17) 18+2 @ 19+2 (by Osmar Alan). Post placenta. Heart NWS -> rpt 4wks. - US (10/6/17) 22+6 @ 23+2. 576gm. Nl morph. Post placenta. - US (12/8/17) 34+0 @ 32+2. 2245gm (67.5%). KULWANT=23.9.  - flu v/acc (10/6).  TDAP (12/8)

## 2018-01-06 LAB — GP B STREP DNA SPEC QL NAA+PROBE: NEGATIVE

## 2018-01-11 ENCOUNTER — ROUTINE PRENATAL (OUTPATIENT)
Dept: OBGYN CLINIC | Age: 23
End: 2018-01-11

## 2018-01-11 VITALS
BODY MASS INDEX: 36.32 KG/M2 | SYSTOLIC BLOOD PRESSURE: 103 MMHG | HEART RATE: 119 BPM | HEIGHT: 66 IN | DIASTOLIC BLOOD PRESSURE: 70 MMHG | WEIGHT: 226 LBS

## 2018-01-11 DIAGNOSIS — Z34.03 ENCOUNTER FOR SUPERVISION OF NORMAL FIRST PREGNANCY IN THIRD TRIMESTER: Primary | ICD-10-CM

## 2018-01-11 NOTE — PATIENT INSTRUCTIONS
Counting Your Baby's Kicks: Care Instructions  Your Care Instructions    Counting your baby's kicks is one way your doctor can tell that your baby is healthy. Most women-especially in a first pregnancy-feel their baby move for the first time between 16 and 22 weeks. The movement may feel like flutters rather than kicks. Your baby may move more at certain times of the day. When you are active, you may notice less kicking than when you are resting. At your prenatal visits, your doctor will ask whether the baby is active. In your last trimester, your doctor may ask you to count the number of times you feel your baby move. Follow-up care is a key part of your treatment and safety. Be sure to make and go to all appointments, and call your doctor if you are having problems. It's also a good idea to know your test results and keep a list of the medicines you take. How do you count fetal kicks? · A common method of checking your baby's movement is to count the number of kicks or moves you feel in 1 hour. Ten movements (such as kicks, flutters, or rolls) in 1 hour are normal. Some doctors suggest that you count in the morning until you get to 10 movements. Then you can quit for that day and start again the next day. · Pick your baby's most active time of day to count. This may be any time from morning to evening. · If you do not feel 10 movements in an hour, your baby may be sleeping. Wait for the next hour and count again. When should you call for help? Call your doctor now or seek immediate medical care if:  ? · You noticed that your baby has stopped moving or is moving much less than normal.   ? Watch closely for changes in your health, and be sure to contact your doctor if you have any problems. Where can you learn more? Go to http://ricci-so.info/. Enter O181 in the search box to learn more about \"Counting Your Baby's Kicks: Care Instructions. \"  Current as of: March 16, 2017  Content Version: 11.4  © 4651-3730 Healthwise, Incorporated. Care instructions adapted under license by [employer] (which disclaims liability or warranty for this information). If you have questions about a medical condition or this instruction, always ask your healthcare professional. [address] any warranty or liability for your use of this information.

## 2018-01-11 NOTE — PROGRESS NOTES
LIMITED OB SCAN  A SINGLE VERTEX 37W1D IUP IS SEEN. FETAL CARDIAC MOTION OBSERVED. LIMITED ANATOMY WAS VISUALIZED AND APPEARS WNL. APPROPRIATE FETAL GROWTH IS SEEN. SIZE=DATES. KULWANT AND PLACENTA APPEAR WITHIN NORMAL LIMITS. Some thigh discomfort. +FM. US today wnl: AUF=7551 (72%). Labor/ROM/FM prec. RTO 1wk. Problem List  Date Reviewed: 1/11/2018          Codes Class Noted    Pregnancy ICD-10-CM: Z34.90  ICD-9-CM: V22.2  9/8/2017    Overview Addendum 12/11/2017  3:53 PM by Eulalia Gilmore MD     - transfer OB @ 19wks  - ELVIA=1/31/18, confirmed by Cathy Agee  - declines CF, QS, NIPS  - varicella immune  - US today, cwd, heart not well seen. Rpt US with next visit  - US (9/8/17) 18+2 @ 19+2 (by Cathy Agee). Post placenta. Heart NWS -> rpt 4wks. - US (10/6/17) 22+6 @ 23+2. 576gm. Nl morph. Post placenta. - US (12/8/17) 34+0 @ 32+2. 2245gm (67.5%). KULWANT=23.9.  - flu v/acc (10/6).  TDAP (12/8)

## 2018-01-18 ENCOUNTER — ROUTINE PRENATAL (OUTPATIENT)
Dept: OBGYN CLINIC | Age: 23
End: 2018-01-18

## 2018-01-18 VITALS
SYSTOLIC BLOOD PRESSURE: 103 MMHG | WEIGHT: 223 LBS | DIASTOLIC BLOOD PRESSURE: 69 MMHG | BODY MASS INDEX: 35.84 KG/M2 | HEIGHT: 66 IN | HEART RATE: 111 BPM

## 2018-01-18 DIAGNOSIS — Z34.03 ENCOUNTER FOR SUPERVISION OF NORMAL FIRST PREGNANCY IN THIRD TRIMESTER: Primary | ICD-10-CM

## 2018-01-18 NOTE — PATIENT INSTRUCTIONS
Week 38 of Your Pregnancy: Care Instructions  Your Care Instructions    Believe it or not, your baby is almost here. You may have ideas about your baby's personality because of how much he or she moves. Or you may have noticed how he or she responds to sounds, warmth, cold, and light. You may even know what kind of music your baby likes. By now, you have a better idea of what to expect during delivery. You may have talked about your birth preferences with your doctor. But even if you want a vaginal birth, it is a good idea to learn about  births.  birth means that your baby is born through a cut (incision) in your lower belly. It is sometimes the best choice for the health of the baby and the mother. This care sheet can help you understand  births. It also gives you information about what to expect after your baby is born. And it helps you understand more about postpartum depression. Follow-up care is a key part of your treatment and safety. Be sure to make and go to all appointments, and call your doctor if you are having problems. It's also a good idea to know your test results and keep a list of the medicines you take. How can you care for yourself at home? Learn about  birth  · Most C-sections are unplanned. They are done because of problems that occur during labor. These problems might include:  ¨ Labor that slows or stops. ¨ High blood pressure or other problems for the mother. ¨ Signs of distress in the baby. These signs may include a very fast or slow heart rate. · Although most mothers and babies do well after , it is major surgery. It has more risks than a vaginal delivery. · In some cases, a planned  may be safer than a vaginal delivery. This may be the case if:  ¨ The mother has a health problem, such as a heart condition. ¨ The baby isn't in a head-down position for delivery. This is called a breech position.   ¨ The uterus has scars from past surgeries. This could increase the chance of a tear in the uterus. ¨ There is a problem with the placenta. ¨ The mother has an infection, such as genital herpes, that could be spread to the baby. ¨ The mother is having twins or more. ¨ The baby weighs 9 to 10 pounds or more. · Because of the risks of , planned C-sections generally should be done only for medical reasons. And a planned  should be done at 39 weeks or later unless there is a medical reason to do it sooner. Know what to expect after delivery, and plan for the first few weeks at home  · You, your baby, and your partner or  will get identification bands. Only people with matching bands can  the baby from the nursery. · You will learn how to feed, diaper, and bathe your baby. And you will learn how to care for the umbilical cord stump. If your baby will be circumcised, you will also learn how to care for that. · Ask people to wait to visit you until you are at home. And ask them to wash their hands before they touch your baby. · Make sure you have another adult in your home for at least 2 or 3 days after the birth. · During the first 2 weeks, limit when friends and family can visit. · Do not allow visitors who have colds or infections. Make sure all visitors are up to date with their vaccinations. Never let anyone smoke around your baby. · Try to nap when the baby naps. Be aware of postpartum depression  · \"Baby blues\" are common for the first 1 to 2 weeks after birth. You may cry or feel sad or irritable for no reason. · For some women, these feelings last longer and are more intense. This is called postpartum depression. · If your symptoms last for more than a few weeks or you feel very depressed, ask your doctor for help. · Postpartum depression can be treated. Support groups and counseling can help. Sometimes medicine can also help. Where can you learn more?   Go to http://ricci-so.info/. Enter B044 in the search box to learn more about \"Week 38 of Your Pregnancy: Care Instructions. \"  Current as of: March 16, 2017  Content Version: 11.4  © 8829-9758 Healthwise, Incorporated. Care instructions adapted under license by [employer] (which disclaims liability or warranty for this information). If you have questions about a medical condition or this instruction, always ask your healthcare professional. [address] any warranty or liability for your use of this information.

## 2018-01-18 NOTE — PROGRESS NOTES
+FM, c/o URI, thigh aches. Tentatively schedule IOL for 2/8, FB day before. RTO 1wk. - transfer OB @ 19wks  - ELVIA=1/31/18, confirmed by Oly Trimble  - declines CF, QS, NIPS  - varicella immune  - US today, cwd, heart not well seen. Rpt US with next visit  - US (9/8/17) 18+2 @ 19+2 (by Oly Trimble). Post placenta. Heart NWS -> rpt 4wks. - US (10/6/17) 22+6 @ 23+2. 576gm. Nl morph. Post placenta. - US (12/8/17) 34+0 @ 32+2. 2245gm (67.5%). KULWANT=23.9.  - US (1/11/18) 38+0 @ 37+1. 3376gm (72%). KULWANT=17.  - flu v/acc (10/6).  TDAP (12/8)

## 2018-01-26 ENCOUNTER — ROUTINE PRENATAL (OUTPATIENT)
Dept: OBGYN CLINIC | Age: 23
End: 2018-01-26

## 2018-01-26 VITALS
WEIGHT: 223 LBS | SYSTOLIC BLOOD PRESSURE: 117 MMHG | BODY MASS INDEX: 35.84 KG/M2 | DIASTOLIC BLOOD PRESSURE: 71 MMHG | HEART RATE: 94 BPM | HEIGHT: 66 IN

## 2018-01-26 DIAGNOSIS — Z34.03 ENCOUNTER FOR SUPERVISION OF NORMAL FIRST PREGNANCY IN THIRD TRIMESTER: Primary | ICD-10-CM

## 2018-01-26 NOTE — PROGRESS NOTES
+FM.  No VB/LOF. No c/o.  cvx unchanged. Has . RTO 1wk. - transfer OB @ 19wks  - ELVIA=1/31/18, confirmed by Lissett León  - declines CF, QS, NIPS  - varicella immune  - US today, cwd, heart not well seen. Rpt US with next visit  - US (9/8/17) 18+2 @ 19+2 (by Lissett León). Post placenta. Heart NWS -> rpt 4wks. - US (10/6/17) 22+6 @ 23+2. 576gm. Nl morph. Post placenta. - US (12/8/17) 34+0 @ 32+2. 2245gm (67.5%). KULWANT=23.9.  - US (1/11/18) 38+0 @ 37+1. 3376gm (72%). KULWANT=17.  - flu v/acc (10/6). TDAP (12/8)  - Induction (2/8/18) FB (2/7). Patient notified. Instructions given.

## 2018-01-26 NOTE — PATIENT INSTRUCTIONS
Week 39 of Your Pregnancy: Care Instructions  Your Care Instructions    During these final weeks, you may feel anxious to see your new baby. [place] babies often look different from what you see in pictures or movies. Right after birth, their heads may have a strange shape. Their eyes may be puffy. And their genitals may be swollen. They may also have very dry skin, or red marks on the eyelids, nose, or neck. Still, most parents think their babies are beautiful. Follow-up care is a key part of your treatment and safety. Be sure to make and go to all appointments, and call your doctor if you are having problems. It's also a good idea to know your test results and keep a list of the medicines you take. How can you care for yourself at home? Prepare to breastfeed  · If you are breastfeeding, continue to eat healthy foods. · Avoid alcohol, cigarettes, and drugs. This includes prescription and over-the-counter medicines. · You can help prevent sore nipples if you feed your baby in the correct position. Nurses will help you learn to do this. · Your  will need to be fed about every 1½ to 3 hours. Choose the right birth control after your baby is born  · Women who are breastfeeding can still get pregnant. Use birth control if you don't want to get pregnant. · Intrauterine devices (IUDs) work for women who want to wait at least 2 years before getting pregnant again. They are safe to use while you are breastfeeding. · Depo-Provera can be used while you are breastfeeding. It is a shot you get every 3 months. · Birth control pills work well. But you need a different kind of pill while you are breastfeeding. And when you start taking these pills, you need to make sure to use another type of birth control until you start your second pack. · Diaphragms, cervical caps, tubal implants, and condoms with spermicide work less well after birth.  If you have a diaphragm or cervical cap, you will need to have it refitted. · Tubal ligation (tying your tubes) and vasectomy are both permanent. These are good options if you are sure you are done having children. Where can you learn more? Go to http://ricci-so.info/. Enter A290 in the search box to learn more about \"Week 39 of Your Pregnancy: Care Instructions. \"  Current as of: March 16, 2017  Content Version: 11.4  © 9226-4388 [employer]. Care instructions adapted under license by [employer] (which disclaims liability or warranty for this information). If you have questions about a medical condition or this instruction, always ask your healthcare professional. [address] any warranty or liability for your use of this information.

## 2018-02-02 ENCOUNTER — ROUTINE PRENATAL (OUTPATIENT)
Dept: OBGYN CLINIC | Age: 23
End: 2018-02-02

## 2018-02-02 VITALS
SYSTOLIC BLOOD PRESSURE: 112 MMHG | BODY MASS INDEX: 35.84 KG/M2 | HEIGHT: 66 IN | DIASTOLIC BLOOD PRESSURE: 72 MMHG | WEIGHT: 223 LBS

## 2018-02-02 DIAGNOSIS — Z34.03 ENCOUNTER FOR SUPERVISION OF NORMAL FIRST PREGNANCY IN THIRD TRIMESTER: Primary | ICD-10-CM

## 2018-02-02 NOTE — PATIENT INSTRUCTIONS
Week 40 of Your Pregnancy: Care Instructions  Your Care Instructions    By week 40, you have reached your due date. Your baby could be coming any day. But it's a good idea to think ahead to the next few weeks and what might happen. If this is your first time having a baby, try not to worry. If you don't start labor on your own by 41 or 42 weeks, your doctor may recommend giving you medicines to start labor. This care sheet gives you information about how labor can be started. It also gives you some ideas about breathing exercises you can do if you start to feel anxious or if you are trying to relax. Follow-up care is a key part of your treatment and safety. Be sure to make and go to all appointments, and call your doctor if you are having problems. It's also a good idea to know your test results and keep a list of the medicines you take. How can you care for yourself at home? Learn how labor can be started  · If you and your baby are both healthy and ready, and if your cervix has started to open, your doctor may \"break your water\" (rupture the amniotic sac). This often starts labor. · If your cervix is not quite ready, you may get a medicine called Pitocin through an IV to start contractions. · If your cervix is still very firm, you may have prostaglandin tablets (misoprostol) placed in your vagina to soften the cervix. Try guided imagery to help you relax  · Find a comfortable place to sit or lie down. Close your eyes. · Start by just taking a few deep breaths to help you relax. · Picture a setting that is calm and peaceful. This could be a beach, a mountain setting, a meadow, or a scene that you choose. · Imagine your scene, and try to add some detail. For example, is there a breeze? What does the garima look like? Is it clear, or are there clouds? · It often helps to add a path to your scene.  For example, as you enter the meadow, imagine a path leading you through the meadow to the trees on the other side. As you follow the path farther into the [facility] you feel more and more relaxed. · When you are deep into your scene and are feeling relaxed, take a few minutes to breathe slowly and feel the calm. · When you are ready, slowly take yourself out of the scene back to the present. Tell yourself that you will feel relaxed and refreshed and will bring that sense of calm with you. · Count to 3, and open your eyes. Where can you learn more? Go to http://ricci-so.info/. Enter I955 in the search box to learn more about \"Week 40 of Your Pregnancy: Care Instructions. \"  Current as of: March 16, 2017  Content Version: 11.4  © 2810-3130 Healthwise, Incorporated. Care instructions adapted under license by [employer] (which disclaims liability or warranty for this information). If you have questions about a medical condition or this instruction, always ask your healthcare professional. [address] any warranty or liability for your use of this information.

## 2018-02-07 ENCOUNTER — ANESTHESIA (OUTPATIENT)
Dept: LABOR AND DELIVERY | Age: 23
End: 2018-02-07
Payer: COMMERCIAL

## 2018-02-07 ENCOUNTER — ANESTHESIA EVENT (OUTPATIENT)
Dept: LABOR AND DELIVERY | Age: 23
End: 2018-02-07
Payer: COMMERCIAL

## 2018-02-07 ENCOUNTER — HOSPITAL ENCOUNTER (INPATIENT)
Age: 23
LOS: 3 days | Discharge: HOME | End: 2018-02-10
Attending: OBSTETRICS & GYNECOLOGY | Admitting: OBSTETRICS & GYNECOLOGY
Payer: COMMERCIAL

## 2018-02-07 ENCOUNTER — ROUTINE PRENATAL (OUTPATIENT)
Dept: OBGYN CLINIC | Age: 23
End: 2018-02-07

## 2018-02-07 VITALS
HEIGHT: 66 IN | BODY MASS INDEX: 36.32 KG/M2 | SYSTOLIC BLOOD PRESSURE: 102 MMHG | WEIGHT: 226 LBS | HEART RATE: 89 BPM | DIASTOLIC BLOOD PRESSURE: 68 MMHG

## 2018-02-07 DIAGNOSIS — O48.0 41 WEEKS GESTATION OF PREGNANCY: ICD-10-CM

## 2018-02-07 DIAGNOSIS — Z3A.41 41 WEEKS GESTATION OF PREGNANCY: ICD-10-CM

## 2018-02-07 DIAGNOSIS — O48.0 POST-TERM PREGNANCY, 40-42 WEEKS OF GESTATION: Primary | ICD-10-CM

## 2018-02-07 DIAGNOSIS — R52 POSTPARTUM PAIN: Primary | ICD-10-CM

## 2018-02-07 LAB
BASOPHILS # BLD: 0 K/UL (ref 0–0.1)
BASOPHILS NFR BLD: 0 % (ref 0–1)
DIFFERENTIAL METHOD BLD: ABNORMAL
EOSINOPHIL # BLD: 0 K/UL (ref 0–0.4)
EOSINOPHIL NFR BLD: 0 % (ref 0–7)
ERYTHROCYTE [DISTWIDTH] IN BLOOD BY AUTOMATED COUNT: 12.6 % (ref 11.5–14.5)
HCT VFR BLD AUTO: 35.2 % (ref 35–47)
HGB BLD-MCNC: 11.2 G/DL (ref 11.5–16)
IMM GRANULOCYTES # BLD: 0 K/UL (ref 0–0.04)
IMM GRANULOCYTES NFR BLD AUTO: 0 % (ref 0–0.5)
LYMPHOCYTES # BLD: 2.3 K/UL (ref 0.8–3.5)
LYMPHOCYTES NFR BLD: 23 % (ref 12–49)
MCH RBC QN AUTO: 27.4 PG (ref 26–34)
MCHC RBC AUTO-ENTMCNC: 31.8 G/DL (ref 30–36.5)
MCV RBC AUTO: 86.1 FL (ref 80–99)
MONOCYTES # BLD: 0.8 K/UL (ref 0–1)
MONOCYTES NFR BLD: 8 % (ref 5–13)
NEUTS SEG # BLD: 6.8 K/UL (ref 1.8–8)
NEUTS SEG NFR BLD: 68 % (ref 32–75)
NRBC # BLD: 0 K/UL (ref 0–0.01)
NRBC BLD-RTO: 0 PER 100 WBC
PLATELET # BLD AUTO: 224 K/UL (ref 150–400)
PMV BLD AUTO: 10 FL (ref 8.9–12.9)
RBC # BLD AUTO: 4.09 M/UL (ref 3.8–5.2)
WBC # BLD AUTO: 10 K/UL (ref 3.6–11)

## 2018-02-07 PROCEDURE — 74011250637 HC RX REV CODE- 250/637: Performed by: OBSTETRICS & GYNECOLOGY

## 2018-02-07 PROCEDURE — 4A0HXCZ MEASUREMENT OF PRODUCTS OF CONCEPTION, CARDIAC RATE, EXTERNAL APPROACH: ICD-10-PCS | Performed by: OBSTETRICS & GYNECOLOGY

## 2018-02-07 PROCEDURE — 85025 COMPLETE CBC W/AUTO DIFF WBC: CPT | Performed by: OBSTETRICS & GYNECOLOGY

## 2018-02-07 PROCEDURE — 74011250636 HC RX REV CODE- 250/636: Performed by: ANESTHESIOLOGY

## 2018-02-07 PROCEDURE — 3E0R3BZ INTRODUCTION OF ANESTHETIC AGENT INTO SPINAL CANAL, PERCUTANEOUS APPROACH: ICD-10-PCS | Performed by: ANESTHESIOLOGY

## 2018-02-07 PROCEDURE — 59200 INSERT CERVICAL DILATOR: CPT | Performed by: OBSTETRICS & GYNECOLOGY

## 2018-02-07 PROCEDURE — 36415 COLL VENOUS BLD VENIPUNCTURE: CPT | Performed by: OBSTETRICS & GYNECOLOGY

## 2018-02-07 PROCEDURE — 77030014125 HC TY EPDRL BBMI -B: Performed by: ANESTHESIOLOGY

## 2018-02-07 PROCEDURE — 75410000002 HC LABOR FEE PER 1 HR: Performed by: OBSTETRICS & GYNECOLOGY

## 2018-02-07 PROCEDURE — 65270000029 HC RM PRIVATE

## 2018-02-07 PROCEDURE — 3E0P7VZ INTRODUCTION OF HORMONE INTO FEMALE REPRODUCTIVE, VIA NATURAL OR ARTIFICIAL OPENING: ICD-10-PCS | Performed by: OBSTETRICS & GYNECOLOGY

## 2018-02-07 PROCEDURE — 00HU33Z INSERTION OF INFUSION DEVICE INTO SPINAL CANAL, PERCUTANEOUS APPROACH: ICD-10-PCS | Performed by: ANESTHESIOLOGY

## 2018-02-07 PROCEDURE — 74011000250 HC RX REV CODE- 250

## 2018-02-07 RX ORDER — MAG HYDROX/ALUMINUM HYD/SIMETH 200-200-20
30 SUSPENSION, ORAL (FINAL DOSE FORM) ORAL
Status: DISCONTINUED | OUTPATIENT
Start: 2018-02-07 | End: 2018-02-08 | Stop reason: HOSPADM

## 2018-02-07 RX ORDER — LIDOCAINE HYDROCHLORIDE AND EPINEPHRINE 20; 5 MG/ML; UG/ML
INJECTION, SOLUTION EPIDURAL; INFILTRATION; INTRACAUDAL; PERINEURAL AS NEEDED
Status: DISCONTINUED | OUTPATIENT
Start: 2018-02-07 | End: 2018-02-08 | Stop reason: HOSPADM

## 2018-02-07 RX ORDER — ZOLPIDEM TARTRATE 5 MG/1
5 TABLET ORAL
Status: DISCONTINUED | OUTPATIENT
Start: 2018-02-07 | End: 2018-02-10 | Stop reason: HOSPADM

## 2018-02-07 RX ORDER — FENTANYL/BUPIVACAINE/NS/PF 2-1250MCG
1-16 PREFILLED PUMP RESERVOIR EPIDURAL CONTINUOUS
Status: DISCONTINUED | OUTPATIENT
Start: 2018-02-08 | End: 2018-02-08 | Stop reason: HOSPADM

## 2018-02-07 RX ORDER — SODIUM CHLORIDE, SODIUM LACTATE, POTASSIUM CHLORIDE, CALCIUM CHLORIDE 600; 310; 30; 20 MG/100ML; MG/100ML; MG/100ML; MG/100ML
125 INJECTION, SOLUTION INTRAVENOUS CONTINUOUS
Status: DISCONTINUED | OUTPATIENT
Start: 2018-02-08 | End: 2018-02-10 | Stop reason: HOSPADM

## 2018-02-07 RX ORDER — OXYTOCIN IN 5 % DEXTROSE 30/500 ML
0.5 PLASTIC BAG, INJECTION (ML) INTRAVENOUS
Status: DISCONTINUED | OUTPATIENT
Start: 2018-02-08 | End: 2018-02-08 | Stop reason: SDUPTHER

## 2018-02-07 RX ORDER — SODIUM CHLORIDE 0.9 % (FLUSH) 0.9 %
5-10 SYRINGE (ML) INJECTION AS NEEDED
Status: DISCONTINUED | OUTPATIENT
Start: 2018-02-07 | End: 2018-02-10 | Stop reason: HOSPADM

## 2018-02-07 RX ORDER — NALOXONE HYDROCHLORIDE 0.4 MG/ML
0.4 INJECTION, SOLUTION INTRAMUSCULAR; INTRAVENOUS; SUBCUTANEOUS AS NEEDED
Status: DISCONTINUED | OUTPATIENT
Start: 2018-02-07 | End: 2018-02-08 | Stop reason: HOSPADM

## 2018-02-07 RX ORDER — SODIUM CHLORIDE, SODIUM LACTATE, POTASSIUM CHLORIDE, CALCIUM CHLORIDE 600; 310; 30; 20 MG/100ML; MG/100ML; MG/100ML; MG/100ML
125 INJECTION, SOLUTION INTRAVENOUS CONTINUOUS
Status: DISCONTINUED | OUTPATIENT
Start: 2018-02-08 | End: 2018-02-07

## 2018-02-07 RX ORDER — OXYTOCIN IN 5 % DEXTROSE 30/500 ML
.5-2 PLASTIC BAG, INJECTION (ML) INTRAVENOUS
Status: DISCONTINUED | OUTPATIENT
Start: 2018-02-08 | End: 2018-02-10 | Stop reason: HOSPADM

## 2018-02-07 RX ORDER — EPHEDRINE SULFATE 50 MG/ML
10 INJECTION, SOLUTION INTRAVENOUS
Status: DISCONTINUED | OUTPATIENT
Start: 2018-02-07 | End: 2018-02-08 | Stop reason: HOSPADM

## 2018-02-07 RX ADMIN — LIDOCAINE HYDROCHLORIDE AND EPINEPHRINE 10 ML: 20; 5 INJECTION, SOLUTION EPIDURAL; INFILTRATION; INTRACAUDAL; PERINEURAL at 23:28

## 2018-02-07 RX ADMIN — FENTANYL 0.2 MG/100ML-BUPIV 0.125%-NACL 0.9% EPIDURAL INJ 12 ML/HR: 2/0.125 SOLUTION at 23:51

## 2018-02-07 RX ADMIN — DINOPROSTONE 10 MG: 10 INSERT VAGINAL at 12:37

## 2018-02-07 NOTE — IP AVS SNAPSHOT
Summary of Care Report The Summary of Care report has been created to help improve care coordination. Users with access to [email or web address] or [address] (Web-based application) may access additional patient information including the Discharge Summary. If you are not currently a [address] user and need more information, please call the number listed below in the [place] [address] section and ask to be connected with Medical Records. Facility Information Name Address Phone [address] [place] [id number] [phone] Patient Information Patient Name Sex  Robert Marcos ([id number]) Female 1995 Discharge Information Admitting Provider Service Area Unit Cece [address], MD / [phone] [facility] 3 Mother Infant / [phone] Discharge Provider Discharge Date/Time Discharge Disposition Destination (none) 2/10/2018 (Pending) AHR (none) Patient Language Language ENGLISH [13] Hospital Problems as of 2/10/2018  Reviewed: 2018 11:44 AM by Cece [address], MD  
  
  
  
 Class Noted - Resolved Last Modified POA Active Problems Pregnancy  2017 - Present 2018 by Cece [address], MD Unknown Entered by Cece [address], MD  
  Overview Addendum 2018 11:47 AM by Cece [address], MD  
   - transfer OB @ [facility] - ELVIA=18, confirmed by Sonja Fuentes 
- declines CF, QS, NIPS 
- varicella immune - US today, cwd, heart not well seen. Rpt US with next visit 
- US (17) 18+2 @ 19+2 (by Sonja Fuentes). Post placenta. Heart NWS -> rpt 4wks. - US (10/6/17) 22+6 @ 23+2. 576gm. Nl morph. Post placenta. - US (17) 34+0 @ 32+2. 2245gm (67.5%). KULWANT=23.9. 
- US (18) 38+0 @ 37+1. 3376gm (72%). KULWANT=17. 
- US (18) 40+4 @ 41+0. 4241gm. KULWANT=12. 
- flu v/acc (10/6). TDAP () - Induction (18) FB (). Patient notified. Instructions given. Non-Hospital Problems as of 2/10/2018  Reviewed: 2018 11:44 AM by Jerson Chiang MD  
 None You are allergic to the following No active allergies Current Discharge Medication List  
  
START taking these medications Dose & Instructions Dispensing Information Comments HYDROcodone-acetaminophen 5-325 mg per tablet Commonly known as:  NORCO  
 1-2 tabs by mouth every 4-6 hours as needed for pain Quantity:  10 Tab Refills:  0 CONTINUE these medications which have NOT CHANGED Dose & Instructions Dispensing Information Comments AMBULATORY BREAST PUMP Double electric breast pump, dual setup Z39.1 ELVIA=18 Quantity:  1 Units Refills:  0  
   
 prenatal vitamin 27 mg iron- 800 mcg Tab tablet Dose:  1 Tab Take 1 Tab by mouth. Refills:  0 Current Immunizations Name Date Influenza Vaccine (Quad) PF 10/6/2017 Tdap 2017 Surgery Information ID Date/Time Status Primary Surgeon All Procedures Location [id number] 2018 Complete   ZZANESTHESIA [facility] - DO NOT SCHEDULE Follow-up Information Follow up With Details Comments Contact Info None   None (395) Patient stated that they have no PCP Discharge Instructions POST DELIVERY DISCHARGE INSTRUCTIONS Name: Sana Estimable YOB: 1995 General:  
 
Diet/Diet Restrictions: 
Eight 8-ounce glasses of fluid daily (primarily water); avoid excessive caffeine intake. Meals/snacks as desired which are high in fiber and complex carbohydrates and low in fat and cholesterol. Physical Activity / Restrictions / Safety:  
 
Avoid heavy lifting, no more that 15 lbs. For 2-3 weeks; No driving while taking narcotic pain medication. Post  patients should not drive until pain free. No intercourse until seen for your 6 week postpartum visit, no douching or tampon use. No swimming or tub baths for 6 weeks. May resume exercise in 4-6 weeks. Discharge Instructions/Special Treatment/Home Care Needs:  
 
Continue prenatal vitamins. Continue to use squirt bottle with warm water on your episiotomy after each bathroom use until bleeding stops. If steri-strips applied to your incision, remove in 14 days. Take stool softeners daily. Call your doctor for the following:  
 
Fever over 101 degrees by mouth. Vaginal bleeding heavier than a normal menstrual period or lost larger than a golf ball. Red streaks or increased swelling of legs, painful red streaks on your breast. 
Painful urination, or increased pain, redness or discharge with your incision. Pain Management:  
 
Pain Management:  
Take Acetaminophen (Tylenol) or Ibuprofen (Advil, Motrin), as directed for pain. Use a warm Sitz bath 3 times daily to relieve episiotomy or hemorrhoidal discomfort. Heating pad to  incision as needed. For hemorrhoidal discomfort, use Tucks and Anusol cream as needed and directed. Follow-Up Care:  
 
Appointment with MD: Follow-up Appointments Procedures  FOLLOW UP VISIT Appointment in: 6 Weeks Standing Status:   Standing Number of Occurrences:   1 Order Specific Question:   Appointment in Answer:   6 Weeks Telephone number: [phone] Signed By: Eulalia Gilmore MD                                                                                                   Date: 2018 Time: 12:23 PM 
 
Chart Review Routing History Recipient Method Report Sent By [facility] Eulalia Gilmore MD  
Phone: [phone] In [facility] CUSTOM LAB REPORT Johanna Tapia [[id number]] 2017 12:32 PM 2017

## 2018-02-07 NOTE — IP AVS SNAPSHOT
Mike [address] 
[phone] Patient: Keron Chiu MRN: [id number] :1995 About your hospitalization You were admitted on:  2018 You last received care in the:  [facility] [address] You were discharged on:  February 10, 2018 Why you were hospitalized Your primary diagnosis was:  Not on File Your diagnoses also included:  Pregnancy Follow-up Information Follow up With Details Comments Contact Info None   None (395) Patient stated that they have no PCP Discharge Orders None A check denilson indicates which time of day the medication should be taken. My Medications START taking these medications Instructions Each Dose to Equal  
 Morning Noon Evening Bedtime HYDROcodone-acetaminophen 5-325 mg per tablet Commonly known as:  Declan Vázquez Your last dose was: Your next dose is:    
   
   
 1-2 tabs by mouth every 4-6 hours as needed for pain CONTINUE taking these medications Instructions Each Dose to Equal  
 Morning Noon Evening Bedtime AMBULATORY BREAST PUMP Your last dose was: Your next dose is:    
   
   
 Double electric breast pump, dual setup Z39.1 ELVIA=18  
     
   
   
   
  
 prenatal vitamin 27 mg iron- 800 mcg Tab tablet Your last dose was: Your next dose is: Take 1 Tab by mouth. 1 Tab Where to Get Your Medications Information on where to get these meds will be given to you by the nurse or doctor. ! Ask your nurse or doctor about these medications HYDROcodone-acetaminophen 5-325 mg per tablet Discharge Instructions POST DELIVERY DISCHARGE INSTRUCTIONS Name: Keron Chiu YOB: 1995 General:  
 
Diet/Diet Restrictions: Eight 8-ounce glasses of fluid daily (primarily water); avoid excessive caffeine intake. Meals/snacks as desired which are high in fiber and complex carbohydrates and low in fat and cholesterol. Physical Activity / Restrictions / Safety:  
 
Avoid heavy lifting, no more that 15 lbs. For 2-3 weeks; No driving while taking narcotic pain medication. Post  patients should not drive until pain free. No intercourse until seen for your 6 week postpartum visit, no douching or tampon use. No swimming or tub baths for 6 weeks. May resume exercise in 4-6 weeks. Discharge Instructions/Special Treatment/Home Care Needs:  
 
Continue prenatal vitamins. Continue to use squirt bottle with warm water on your episiotomy after each bathroom use until bleeding stops. If steri-strips applied to your incision, remove in 14 days. Take stool softeners daily. Call your doctor for the following:  
 
Fever over 101 degrees by mouth. Vaginal bleeding heavier than a normal menstrual period or lost larger than a golf ball. Red streaks or increased swelling of legs, painful red streaks on your breast. 
Painful urination, or increased pain, redness or discharge with your incision. Pain Management:  
 
Pain Management:  
Take Acetaminophen (Tylenol) or Ibuprofen (Advil, Motrin), as directed for pain. Use a warm Sitz bath 3 times daily to relieve episiotomy or hemorrhoidal discomfort. Heating pad to  incision as needed. For hemorrhoidal discomfort, use Tucks and Anusol cream as needed and directed. Follow-Up Care:  
 
Appointment with MD: Follow-up Appointments Procedures  FOLLOW UP VISIT Appointment in: 6 Weeks Standing Status:   Standing Number of Occurrences:   1 Order Specific Question:   Appointment in Answer:   6 Weeks Telephone number: [phone] Signed By: Liam Beckman MD Date: 2/9/2018 Time: 12:23 PM 
 
  
  
  
ChartCube Announcement We are excited to announce that we are making your provider's discharge notes available to you in ChartCube. You will see these notes when they are completed and signed by the physician that discharged you from your recent hospital stay. If you have any questions or concerns about any information you see in ChartCube, please call the Health Information Department where you were seen or reach out to your Primary Care Provider for more information about your plan of care. Introducing hospitals & HEALTH SERVICES! Dear Tara Titus: 
Thank you for requesting a ChartCube account. Our records indicate that you already have an active ChartCube account. You can access your account anytime at [email or web address] Did you know that you can access your hospital and ER discharge instructions at any time in ChartCube? You can also review all of your test results from your hospital stay or ER visit. Additional Information If you have questions, please visit the Frequently Asked Questions section of the ChartCube website at [email or web address] Remember, ChartCube is NOT to be used for urgent needs. For medical emergencies, dial 911. Now available from your iPhone and Android! Providers Seen During Your Hospitalization Provider Specialty Primary office phone [address] MD Obstetrics & Gynecology [phone] Your Primary Care Physician (PCP) Primary Care Physician Office Phone Office Fax NONE ** None ** ** None ** You are allergic to the following No active allergies Recent Documentation Height Weight Breastfeeding? BMI OB Status Smoking Status 1.676 m 102.5 kg Unknown 36.48 kg/m2 Recent pregnancy Never Smoker Emergency Contacts Name Discharge Info Relation Home Work Mobile Calista Martinez DISCHARGE CAREGIVER [3] Sister [23] [phone] Patient Belongings The following personal items are in your possession at time of discharge: 
  Dental Appliances: None  Visual Aid: None      Home Medications: None   Jewelry: Body Piercing, With patient  Clothing: At bedside    Other Valuables: At bedside, Cell Phone Please provide this summary of care documentation to your next provider. Signatures-by signing, you are acknowledging that this After Visit Summary has been reviewed with you and you have received a copy. Patient Signature:  ____________________________________________________________ Date:  ____________________________________________________________  
  
[facility] Provider Signature:  ____________________________________________________________ Date:  ____________________________________________________________

## 2018-02-07 NOTE — H&P
History & Physical    Name: Corbin Ceron MRN: [id number]  SSN: [id number]    YOB: 1995  Age: 23 y.o. Sex: female        Subjective:     Estimated Date of Delivery: 18  OB History      Para Term  AB Living    1         SAB TAB Ectopic Molar Multiple Live Births                   Ms. Gisela Bautista who presents with pregnancy at 41w0d for induction of labor. Prenatal course was normal. Please see prenatal records for details. FB insertion attempted in the office today. Unsuccessful, balloon backed out of cervix on multiple attempts. Admitted to L&D for cervidil, pitocin in AM.    Past Medical History:   Diagnosis Date    Chlamydia     no outbreaks in pregnancy     Hx of chlamydia infection 2014    Routine Papanicolaou smear 2017    negative (in media tab)     History reviewed. No pertinent surgical history. Social History     Occupational History    Not on file. Social History Main Topics    Smoking status: Never Smoker    Smokeless tobacco: Never Used      Comment: Never used vapor or e-cigs     Alcohol use No    Drug use: No    Sexual activity: Yes     Partners: Male     Birth control/ protection: None     Family History   Problem Relation Age of Onset    Hypertension Mother     Breast Cancer Maternal Grandmother 54    Cancer Maternal Grandmother      Colon    Breast Cancer Paternal Grandmother 72    Cancer Paternal Grandfather 79     Liver       No Known Allergies  Prior to Admission medications    Medication Sig Start Date End Date Taking? Authorizing Provider   PRENATAL ZBIK79/OQQC FUM/FOLIC (PRENATAL VITAMIN) 27 mg iron- 800 mcg tab tablet Take 1 Tab by mouth. Yes Historical Provider   AMBULATORY BREAST PUMP Double electric breast pump, dual setup  Z39.1  ELVIA=18   Mellisa Simons MD        Review of Systems: A comprehensive review of systems was negative except for that written in the HPI.     Objective:     Vitals:  Vitals: 02/07/18 1156 02/07/18 1202   BP:  124/72   Pulse:  84   Resp:  16   Temp:  97.9 °F (36.6 °C)   Weight: 226 lb (102.5 kg)    Height: 5' 6\" (1.676 m)         Physical Exam:  Patient without distress. Heart: Regular rate and rhythm or S1S2 present  Lung: clear to auscultation throughout lung fields, no wheezes, no rales, no rhonchi and normal respiratory effort  Abdomen: soft, nontender  Fundus: soft and non tender  Perineum: blood absent prior to attempted FB placement, amniotic fluid absent  Cervical Exam: 1 cm dilated    0% effaced    -3 station    Presenting Part: cephalic  Cervical Position: posterior  Consistency: Soft  Lower Extremities:  - Edema 1+   - No cords or calf tenderness. Membranes:  Intact  Fetal Heart Rate: Reactive  Baseline: 135 per minute  Variability: moderate  Accelerations: yes  Decelerations: none  Uterine contractions: irregular, with some irritability, pt not feeling any of this. EFW: 4241gm on US in office today    Prenatal Labs:   Lab Results   Component Value Date/Time    Rubella, External Immune 07/07/2017    GrBStrep, External Negative  01/04/2018    HBsAg, External Negative  07/07/2017    HIV, External Negative  07/07/2017    RPR, External Non-Reactive 07/07/2017    Gonorrhea, External Negative  07/07/2017    Chlamydia, External Negative  07/07/2017        Assessment/Plan:     Plan: Admit for IOL for postdates. Group B Strep was negative.     Signed By:  Twan Prado MD     February 7, 2018

## 2018-02-07 NOTE — PROGRESS NOTES
LIMITED OB SCAN  A SINGLE VERTEX 41W0D IUP IS SEEN. FETAL CARDIAC MOTION OBSERVED. LIMITED ANATOMY WAS VISUALIZED AND APPEARS WNL. FETUS APPEARS LGA. SIZE > DATES. FETUS > 90%. KULWANT AND PLACENTA APPEAR WNL. Cervical Rhodes insertion procedure note    Dasia Tanner is a ,  23 y.o. female who presents today far placement of a rhodes catheter in the cervix for ripening. Her cervix is unfavorable and she is scheduled for induction tomorrow. She has elected to have a cervical rhodes catheter placement today. The risks, benefits and assets of the procedure were discussed. Her questions were answered. PROCEDURE: The vagina and cervix was prepped with Zephiran. A Rhodes catheter was placed through the cervix but was difficult to advance. The bulb was inflated with sterile saline, but the balloon backed out of the cervix. The cervix was grasped with a tenaculum, then ring forceps to try to get countertraction. The rhodes was reinserted, but again the balloon backed out of the cervix as it was being filled. Some cervical bleeding was noted. The pt also had vagal response, lightheaded. Procedure was terminated. Pt placed on left side for a few minut  She was taken to L&D (via wheelchair) for admission and placement of cervidil.

## 2018-02-07 NOTE — PROGRESS NOTES
1130 Patient arrived to labor and delivery for scheduled induction for postdates. Dr. Nathan Staples attempted rhodes balloon placement in office but was unsuccessful. Patient admitted for cervidil induction. 1145 Patient placed on EFM at this time. 200 Dr. Nathan Staples at the bedside. Cervidil to be in place for 12 hours then pulled. Pitocin to be started at 0400.     1237 Dr. Nathan Staples at bedside for cervidil placement. 1340 Patient sitting up eating lunch. Difficult to trace FHR due to maternal positioning. 1510 Patient has visitors at bedside. No complaints. 1645 No complaints. 1729 No complaints. 1805 No complaints. Family at bedside. 1910 Bedside and Verbal shift change report given to Liliana Velasco RN (oncoming nurse) by Ascencion Blizzard, RN (offgoing nurse). Report included the following information SBAR, Kardex, Intake/Output, MAR, Accordion, Recent Results and Med Rec Status.

## 2018-02-07 NOTE — PATIENT INSTRUCTIONS
Week 41 of Your Pregnancy: Care Instructions  Your Care Instructions    By now, you're probably tired of people asking you when the baby is going to be born. Your baby could come any Marisa Callahan today! Your doctor wants to make sure that you have a safe birth and so may recommend giving you medicines to start labor or scheduling a  delivery. Most women who give birth after their due dates have healthy newborns. But you may have tests to make sure everything is okay. This care sheet will help you prepare for giving birth after 41 weeks. Follow-up care is a key part of your treatment and safety. Be sure to make and go to all appointments, and call your doctor if you are having problems. It's also a good idea to know your test results and keep a list of the medicines you take. How can you care for yourself at home? At about 41 weeks  · Your doctor will measure the amount of fluid surrounding the baby and test your baby's movement and heart rate. · If your baby seems strong and well, your doctor might give you medicine to start labor. · If there are other concerns, your doctor may tell you that a  delivery would be best for you and your baby. After 42 weeks  · Your baby may be harder to deliver. · The placenta may no longer be able to meet your baby's needs. · The baby might have a bowel movement into the amniotic fluid, which could go into the baby's lungs. Ease or reduce swelling in your feet, ankles, hands, and fingers  · If your fingers are puffy, take off your rings. · Do not eat high-salt foods, such as potato chips. · Prop up your feet on a stool or couch as much as possible. Sleep with pillows under your feet. · Do not stand for long periods of time or wear tight shoes. · Wear support stockings. Where can you learn more? Go to http://ricci-so.info/.   Enter Z944 in the search box to learn more about \"Week 41 of Your Pregnancy: Care Instructions. \"  Current as of: March 16, 2017  Content Version: 11.4  © 6486-5947 Healthwise, [facility]. Care instructions adapted under license by [employer] (which disclaims liability or warranty for this information). If you have questions about a medical condition or this instruction, always ask your healthcare professional. [place] [address] any warranty or liability for your use of this information.

## 2018-02-07 NOTE — PROGRESS NOTES
IOL for postdates. FB placement attempted in offc, unsuccessful. For cervidil. Cervidil placed in posterior fornix without difficulty. Plan:  Remove ~ 0030. Pitocin at 0400.

## 2018-02-08 PROCEDURE — 74011250636 HC RX REV CODE- 250/636: Performed by: OBSTETRICS & GYNECOLOGY

## 2018-02-08 PROCEDURE — 75410000000 HC DELIVERY VAGINAL/SINGLE: Performed by: OBSTETRICS & GYNECOLOGY

## 2018-02-08 PROCEDURE — 65270000029 HC RM PRIVATE

## 2018-02-08 PROCEDURE — 74011250636 HC RX REV CODE- 250/636: Performed by: ANESTHESIOLOGY

## 2018-02-08 PROCEDURE — 0DQR0ZZ REPAIR ANAL SPHINCTER, OPEN APPROACH: ICD-10-PCS | Performed by: OBSTETRICS & GYNECOLOGY

## 2018-02-08 PROCEDURE — 75410000003 HC RECOV DEL/VAG/CSECN EA 0.5 HR: Performed by: OBSTETRICS & GYNECOLOGY

## 2018-02-08 PROCEDURE — 74011250637 HC RX REV CODE- 250/637

## 2018-02-08 PROCEDURE — 75410000002 HC LABOR FEE PER 1 HR: Performed by: OBSTETRICS & GYNECOLOGY

## 2018-02-08 PROCEDURE — 74011000250 HC RX REV CODE- 250

## 2018-02-08 PROCEDURE — 77010026065 HC OXYGEN MINIMUM MEDICAL AIR: Performed by: OBSTETRICS & GYNECOLOGY

## 2018-02-08 PROCEDURE — 74011250637 HC RX REV CODE- 250/637: Performed by: OBSTETRICS & GYNECOLOGY

## 2018-02-08 PROCEDURE — 74011250636 HC RX REV CODE- 250/636

## 2018-02-08 PROCEDURE — 76060000078 HC EPIDURAL ANESTHESIA: Performed by: ANESTHESIOLOGY

## 2018-02-08 RX ORDER — LIDOCAINE HYDROCHLORIDE 10 MG/ML
INJECTION INFILTRATION; PERINEURAL
Status: COMPLETED
Start: 2018-02-08 | End: 2018-02-08

## 2018-02-08 RX ORDER — PROMETHAZINE HYDROCHLORIDE 25 MG/1
25 TABLET ORAL
Status: DISCONTINUED | OUTPATIENT
Start: 2018-02-08 | End: 2018-02-10 | Stop reason: HOSPADM

## 2018-02-08 RX ORDER — ONDANSETRON 2 MG/ML
INJECTION INTRAMUSCULAR; INTRAVENOUS
Status: COMPLETED
Start: 2018-02-08 | End: 2018-02-08

## 2018-02-08 RX ORDER — HYDROCODONE BITARTRATE AND ACETAMINOPHEN 5; 325 MG/1; MG/1
1 TABLET ORAL
Status: DISCONTINUED | OUTPATIENT
Start: 2018-02-08 | End: 2018-02-10 | Stop reason: HOSPADM

## 2018-02-08 RX ORDER — SIMETHICONE 80 MG
80 TABLET,CHEWABLE ORAL
Status: DISCONTINUED | OUTPATIENT
Start: 2018-02-08 | End: 2018-02-10 | Stop reason: HOSPADM

## 2018-02-08 RX ORDER — NALOXONE HYDROCHLORIDE 0.4 MG/ML
0.4 INJECTION, SOLUTION INTRAMUSCULAR; INTRAVENOUS; SUBCUTANEOUS AS NEEDED
Status: DISCONTINUED | OUTPATIENT
Start: 2018-02-08 | End: 2018-02-10 | Stop reason: HOSPADM

## 2018-02-08 RX ORDER — OXYTOCIN/RINGER'S LACTATE 20/1000 ML
125-500 PLASTIC BAG, INJECTION (ML) INTRAVENOUS ONCE
Status: ACTIVE | OUTPATIENT
Start: 2018-02-08 | End: 2018-02-09

## 2018-02-08 RX ORDER — HYDROCORTISONE ACETATE PRAMOXINE HCL 2.5; 1 G/100G; G/100G
CREAM TOPICAL AS NEEDED
Status: DISCONTINUED | OUTPATIENT
Start: 2018-02-08 | End: 2018-02-10 | Stop reason: HOSPADM

## 2018-02-08 RX ORDER — PEPPERMINT OIL
SPIRIT ORAL
Status: COMPLETED
Start: 2018-02-08 | End: 2018-02-08

## 2018-02-08 RX ORDER — DOCUSATE SODIUM 100 MG/1
100 CAPSULE, LIQUID FILLED ORAL
Status: DISCONTINUED | OUTPATIENT
Start: 2018-02-08 | End: 2018-02-10 | Stop reason: HOSPADM

## 2018-02-08 RX ORDER — IBUPROFEN 800 MG/1
800 TABLET ORAL
Status: DISCONTINUED | OUTPATIENT
Start: 2018-02-08 | End: 2018-02-10 | Stop reason: HOSPADM

## 2018-02-08 RX ORDER — BUPIVACAINE HYDROCHLORIDE 2.5 MG/ML
INJECTION, SOLUTION EPIDURAL; INFILTRATION; INTRACAUDAL AS NEEDED
Status: DISCONTINUED | OUTPATIENT
Start: 2018-02-08 | End: 2018-02-08 | Stop reason: HOSPADM

## 2018-02-08 RX ORDER — ONDANSETRON 2 MG/ML
4 INJECTION INTRAMUSCULAR; INTRAVENOUS
Status: COMPLETED | OUTPATIENT
Start: 2018-02-08 | End: 2018-02-08

## 2018-02-08 RX ADMIN — BUPIVACAINE HYDROCHLORIDE 10 ML: 2.5 INJECTION, SOLUTION EPIDURAL; INFILTRATION; INTRACAUDAL at 10:35

## 2018-02-08 RX ADMIN — LIDOCAINE HYDROCHLORIDE: 10 INJECTION, SOLUTION INFILTRATION; PERINEURAL at 17:48

## 2018-02-08 RX ADMIN — SODIUM CHLORIDE, SODIUM LACTATE, POTASSIUM CHLORIDE, AND CALCIUM CHLORIDE 125 ML/HR: 600; 310; 30; 20 INJECTION, SOLUTION INTRAVENOUS at 00:07

## 2018-02-08 RX ADMIN — IBUPROFEN 800 MG: 800 TABLET ORAL at 20:19

## 2018-02-08 RX ADMIN — BUPIVACAINE HYDROCHLORIDE 10 ML: 2.5 INJECTION, SOLUTION EPIDURAL; INFILTRATION; INTRACAUDAL at 05:36

## 2018-02-08 RX ADMIN — SODIUM CHLORIDE, SODIUM LACTATE, POTASSIUM CHLORIDE, AND CALCIUM CHLORIDE 999 ML/HR: 600; 310; 30; 20 INJECTION, SOLUTION INTRAVENOUS at 12:40

## 2018-02-08 RX ADMIN — SODIUM CHLORIDE, SODIUM LACTATE, POTASSIUM CHLORIDE, AND CALCIUM CHLORIDE 125 ML/HR: 600; 310; 30; 20 INJECTION, SOLUTION INTRAVENOUS at 07:02

## 2018-02-08 RX ADMIN — FENTANYL 0.2 MG/100ML-BUPIV 0.125%-NACL 0.9% EPIDURAL INJ 12 ML/HR: 2/0.125 SOLUTION at 04:23

## 2018-02-08 RX ADMIN — Medication 2 MILLI-UNITS/MIN: at 08:48

## 2018-02-08 RX ADMIN — FENTANYL 0.2 MG/100ML-BUPIV 0.125%-NACL 0.9% EPIDURAL INJ 12 ML/HR: 2/0.125 SOLUTION at 09:48

## 2018-02-08 RX ADMIN — Medication: at 17:20

## 2018-02-08 RX ADMIN — ONDANSETRON 4 MG: 2 INJECTION INTRAMUSCULAR; INTRAVENOUS at 13:38

## 2018-02-08 RX ADMIN — FENTANYL 0.2 MG/100ML-BUPIV 0.125%-NACL 0.9% EPIDURAL INJ 12 ML/HR: 2/0.125 SOLUTION at 14:45

## 2018-02-08 NOTE — PROGRESS NOTES
C/o \"urge to push\". Rectal pressure with contractions. Discussed relaxation with pressure and not actively push. Pt smiling and no grimacing with contractions. +show, gentle sve, fetal head with holding. Presenting part ROT. 1212: discussed repositioning to far r with pt care nurse returned from brake.

## 2018-02-08 NOTE — PROGRESS NOTES
7:04AM  Bedside and Verbal shift change report given to Citlalli Salmon RN (oncoming nurse) by Pablito Galaviz RN (offgoing nurse). Report included the following information SBAR, Kardex, Procedure Summary, Intake/Output, MAR, Recent Results and Med Rec Status. Assumed care of the pt.     7:26 AM  Dr. Angelica Slaughter in room to assess the pt and to speak with the pt regarding the poc. Will continue to monitor pt.     08:38AM  Dr. Angelica Slaughter in room to assess the pt. SVE performed and pt remains unchanged, IUPC placed by Dr. Angelica Slaughter. Patient to begin on pitocin augmentation. -240 requested by Dr. Angelica Slaughter. Pericare provided, will continue to monitor pt.     10:49 AM  Dr. Sergei Zimmerman in room to provide redose, as was requested by the pt.    12:29PM  Pitocin dose reduced from 10mu to 8mu for increase in baseline. Patient placed on O2 10L via face mask and IV bolus started. Patient repositioned from right lateral to semifowlers position. Will continue to monitor pt.     12:35 PM  Dr. Angelica Slaughter in room to assess the pt. MD reviews the FH tracing and instructs to d/c pitocin. IUPC zero, but baseline noted increased. Pitocin infusion stopped. FH remains Cat I. Will continue to monitor pt.     3:04 PM  Dr. Angelica Slaughter called to make aware pt has a lip, but unable to reduce the lip. Patient to labor down as ordered by Dr. Angelica Slaughter. Will continue to monitor pt.     17:33PM   of viable female baby at 17:33PM by Dr. Angelica Slaughter, [address] [place] [address] . Will continue to monitor pt.     7:04 PM  Bedside and Verbal shift change report given to Mehreen Lea RN (oncoming nurse) by Citlalli Salmon RN (offgoing nurse). Report included the following information SBAR, Kardex, Procedure Summary, Intake/Output, MAR, Recent Results and Med Rec Status.

## 2018-02-08 NOTE — ANESTHESIA PREPROCEDURE EVALUATION
Anesthetic History   No history of anesthetic complications            Review of Systems / Medical History  Patient summary reviewed, nursing notes reviewed and pertinent labs reviewed    Pulmonary  Within defined limits                 Neuro/Psych   Within defined limits           Cardiovascular  Within defined limits                Exercise tolerance: >4 METS     GI/Hepatic/Renal  Within defined limits              Endo/Other  Within defined limits           Other Findings   Comments: Hx chlamydia           Physical Exam    Airway  Mallampati: II    Neck ROM: normal range of motion   Mouth opening: Normal     Cardiovascular  Regular rate and rhythm,  S1 and S2 normal,  no murmur, click, rub, or gallop  Rhythm: regular  Rate: normal         Dental  No notable dental hx       Pulmonary  Breath sounds clear to auscultation               Abdominal  GI exam deferred       Other Findings            Anesthetic Plan    ASA: 2  Anesthesia type: epidural      Post-op pain plan if not by surgeon: indwelling epidural catheter    Induction: Intravenous  Anesthetic plan and risks discussed with: Patient      Late entry - preop done, questions answered, and consent obtained prior to procedure; note entered after procedure at 11:30 PM

## 2018-02-08 NOTE — PROGRESS NOTES
Bedside and Verbal shift change report given to [address] [place] [address] (oncoming nurse) by Tay Milton RN (offgoing nurse). Report included the following information SBAR, Kardex, Recent Results and Med Rec Status. 1900; assumed care of patient, assessment completed. Denies needs. 2015; Patient called out she thinks her water broke. Nitrizine equivical.  intructed patient to call again if she feels more leaking from her vagina. 2035; patient called and is leaking fluid from vagina. Nitrizine positive. Patient up to the bathroom, voided without difficulty and cervidil came out when patient wiped. 2040; spoke to Dr. Lay Whelan. New order received to watch patient for 2 hours and if patient not clara then start pitocin that was originally ordered for 0400 to start at 2200.  2203; patient now is asking for pain medication. Up and ambulated to the bathroom and voided. SVE completed when patient back from bathroom .

## 2018-02-08 NOTE — PROGRESS NOTES
Labor Progress Note  Patient seen, fetal heart rate and contraction pattern evaluated, patient examined. Comfortable. +bloody show  Visit Vitals    /68 (BP 1 Location: Right arm, BP Patient Position: At rest)    Pulse 97    Temp 98.4 °F (36.9 °C)    Resp 14    Ht 5' 6\" (1.676 m)    Wt 226 lb (102.5 kg)    LMP 04/26/2017    SpO2 97%    Breastfeeding No    BMI 36.48 kg/m2       Physical Exam:  Cervical Exam:  7-8 cm dilated    100% effaced    -1/-2 station    Presenting Part: cephalic  Cervical Position: anterior  Consistency: Soft    Uterine Activity: not tracing well, ?q3-4 min? Fetal Heart Rate: reassuring  Baseline: 150 per minute  Variability: moderate  Accelerations: small accels  Decelerations: none    Assessment/Plan:  Without significant change over last 3+ hours.   - IUPC placed  - pitocin augmentation if MVU < 200 with goal of 200-240

## 2018-02-08 NOTE — PROGRESS NOTES
02/08/18 10:37 AM  CM met with patient to complete initial assessment and to begin discharge planning. Demographics were reviewed and confirmed. Patient lives with her dad, who will be her main support, in addition to her mother Brooke Kennedy. Patient's sister, Yohan Myrick ([phone]) will also be with her through the weekend to assist.  Patient's  is also present for assistance and will follow up postpartum. Patient works at [employer] and will have 3 months off from work. She plans to breastfeed and has a pump to use. Dr. Redd Rodriguez at [facility]) will provide medical follow up for the baby. Patient has car seat, crib, clothing, and other necessary supplies. Does not have [address] or Medicaid services. [facility] notified to follow up with patient for Medicaid for the baby, as patient is covered under her mother's insurance. Patient will obtain Jeff0 Laina Fisher following delivery and she knows how to do this.     Care Management Interventions  PCP Verified by CM:  (provided [facility] list)  Mode of Transport at Discharge: Self  Transition of Care Consult (CM Consult): Discharge Planning  Current Support Network: [place] (Lives with dad)  Confirm Follow Up Transport: Family  Plan discussed with Pt/Family/Caregiver: Yes  Discharge Location  Discharge Placement: Home with outpatient services  HARSH Beckford

## 2018-02-08 NOTE — ANESTHESIA PROCEDURE NOTES
Epidural Block    Start time: 2/7/2018 11:17 PM  End time: 2/7/2018 11:30 PM  Performed by: Willard Marcos by: Haydee Grajeda     Pre-Procedure  Indication: labor epidural    Preanesthetic Checklist: patient identified, risks and benefits discussed, anesthesia consent, patient being monitored, timeout performed and anesthesia consent    Timeout Time: 23:17        Epidural:   Patient position:  Seated  Prep region:  Lumbar  Prep: Betadine    Location:  L3-4    Needle and Epidural Catheter:   Needle Type:  Tuohy  Needle Gauge:  17 G  Injection Technique:  Loss of resistance using air  Attempts:  1  Catheter Size:  20 G  Catheter at Skin Depth (cm):  12  Depth in Epidural Space (cm):  5  Events: no blood with aspiration, no cerebrospinal fluid with aspiration and negative aspiration test    Test Dose:  Lidocaine 1.5% w/ epi and negative    Assessment:   Catheter Secured:  Tape  Insertion:  Uncomplicated  Patient tolerance:  Patient tolerated the procedure well with no immediate complications

## 2018-02-08 NOTE — PROGRESS NOTES
Labor Progress Note  Patient seen, fetal heart rate and contraction pattern evaluated. Cervidil placed yesterday early afternoon. SROM ~, clear fluid. Cervidil fell out. Has progressed into spontaneous labor. Has epidural, comfortable. Visit Vitals    /75    Pulse 94    Temp 98.6 °F (37 °C)    Resp 17    Ht 5' 6\" (1.676 m)    Wt 226 lb (102.5 kg)    LMP 2017    SpO2 97%    Breastfeeding No    BMI 36.48 kg/m2       Physical Exam:  Cervical Exam (per RN):  7 cm dilated    100% effaced    -1 station    Presenting Part: cephalic  Membranes:  Spontaneous Rupture of Membranes; Amniotic Fluid: clear fluid  Uterine Activity: Frequency: Every 3-4 minutes  Fetal Heart Rate: Reactive  Baseline: 145-150 per minute  Variability: moderate  Accelerations: yes  Decelerations: had one prolonged decel ~2min duration, saba to 60s. Recovered with O2, reposition  Uterine contractions: regular, every 3-4 minutes    Assessment/Plan:  23yo  @ 41+1. Postdates induction. Has progressed into spontaneous labor with cervidil.  - recheck in ~1hr. If not significantly changed, consider IUPC, pitocin augmentation.

## 2018-02-08 NOTE — PROGRESS NOTES
4322- Patient states feels slightly lightheaded. FHT's readjusted, and cervical exam performed. IV bolus given by Jorge Arzola RN. Patient repositioned to left lateral position. FHT's 130's.  Bloody show noted

## 2018-02-08 NOTE — L&D DELIVERY NOTE
Delivery Summary    Patient: Van Bojorquez MRN: [id number]  SSN: [id number]    YOB: 1995  Age: 23 y.o. Sex: female        Labor Events:    Labor: No    Rupture Date: 2018    Rupture Time: 8:35 PM    Rupture Type SROM    Amniotic Fluid Volume:      Amniotic Fluid Description: Clear  None    Induction: Prostaglandins        Augmentation: Oxytocin    Labor Complications: None     Additional Complications:        Cervical Ripenin2018  12:40 PM  Cervidil      Delivery Events:  Episiotomy: None    Laceration(s): Partial third degree perineal      Repaired: Yes     Number of Repair Packets: 3    Suture Type and Size:    0- Chromic     Estimated Blood Loss (ml): 300      . DAMIEN, left shoulder anterior. No shoulder dystocia. Partial 3rd degree lac with slight separation of ant capsule, no separation of muscle. Reapproximated with 0-vicryl figure-of-8 x1. Additional figure-of-8 stitches placed for venous bleeding. Remainder of repair in usual fashion. One additional figure-of-8 stitch of 0-chromic at introitus. Placenta del spont, intact, 3VC.     Information for the patient's :  Gertrude Gomez, Female [[id number]]     Delivery Summary - Baby    Delivery Date: 2018   Delivery Time: 5:33 PM   Delivery Type: Vaginal, Spontaneous Delivery  Sex:  female  Gestational Age: 40w1d  Delivery Clinician:  Daysi Arroyo  Living?: Living   Delivery Location: L&D LD5           APGARS  One minute Five minutes Ten minutes   Skin Color: 1    1       Heart Rate: 2   2         Reflex Irritability: 2   2         Muscle Tone: 1   2       Respiration: 1   2         Total: 7   9           Presentation: Vertex  Position: Right Occiput Anterior  Resuscitation Method:  Tactile Stimulation;Suctioning-bulb     Meconium Stained: None    Cord Information: 3 Vessels   Complications: None  Cord Blood Sent?:  Yes    Blood Gases Sent?:  Yes    Placenta:  Date/Time:   5:51 PM  Removal: Spontaneous Appearance: Normal;Intact     [place] Measurements:  Birth Weight: 9 lb 10.3 oz (4.375 kg)    Birth Length: 1' 9.5\" (0.546 m)   Head Circumference:       Chest Circumference:      Abdominal Girth: Other Providers:   Milo Olszewski M;RACHEL PORRAS;CARLI CUNHA;RIZWANA PITTMAN;DIANNA DUQUE;ANN-MARIE TREJO;NUHA STERN Obstetrician;Primary Nurse;Primary  Nurse; Anesthesiologist;Student Nurse;Tech;Tech           Cord Blood Results:  Information for the patient's :  Devendra Rand, Female [[id number]]   No results found for: [place], PCTDIG, BILI, ABORHEXT, ABORH    Information for the patient's :  Devendra Rand, Female [[id number]]   No results found for: APH, APCO2, APO2, AHCO3, ABEC, ABDC, O2ST, SITE, [place], PHI, Anum, PO2I, HCO3I, SO2I, IBD     Information for the patient's newbornDeneise Divehi, Female [[id number]]   No results found for: EPHV, PCO2V, PO2V, HCO3V, O2STV, EBDV

## 2018-02-09 PROCEDURE — 74011250637 HC RX REV CODE- 250/637: Performed by: OBSTETRICS & GYNECOLOGY

## 2018-02-09 PROCEDURE — 77030021125

## 2018-02-09 PROCEDURE — 65270000029 HC RM PRIVATE

## 2018-02-09 RX ORDER — HYDROCODONE BITARTRATE AND ACETAMINOPHEN 5; 325 MG/1; MG/1
TABLET ORAL
Qty: 10 TAB | Refills: 0 | Status: SHIPPED | OUTPATIENT
Start: 2018-02-09 | End: 2018-03-20

## 2018-02-09 RX ADMIN — SIMETHICONE CHEW TAB 80 MG 80 MG: 80 TABLET ORAL at 03:48

## 2018-02-09 RX ADMIN — MUPIROCIN: 20 OINTMENT TOPICAL at 03:48

## 2018-02-09 RX ADMIN — HYDROCODONE BITARTRATE AND ACETAMINOPHEN 1 TABLET: 5; 325 TABLET ORAL at 21:41

## 2018-02-09 RX ADMIN — SIMETHICONE CHEW TAB 80 MG 80 MG: 80 TABLET ORAL at 21:41

## 2018-02-09 RX ADMIN — HYDROCODONE BITARTRATE AND ACETAMINOPHEN 1 TABLET: 5; 325 TABLET ORAL at 08:30

## 2018-02-09 RX ADMIN — DOCUSATE SODIUM 100 MG: 100 CAPSULE, LIQUID FILLED ORAL at 21:41

## 2018-02-09 RX ADMIN — DOCUSATE SODIUM 100 MG: 100 CAPSULE, LIQUID FILLED ORAL at 03:48

## 2018-02-09 RX ADMIN — IBUPROFEN 800 MG: 800 TABLET ORAL at 11:58

## 2018-02-09 RX ADMIN — IBUPROFEN 800 MG: 800 TABLET ORAL at 20:24

## 2018-02-09 RX ADMIN — IBUPROFEN 800 MG: 800 TABLET ORAL at 03:48

## 2018-02-09 NOTE — PROGRESS NOTES
1900: Bedside and Verbal shift change report given to NEO Marshall RN (oncoming nurse) by NEO Alberts RN (offgoing nurse). Report included the following information SBAR, Kardex, Intake/Output, MAR, Accordion, Recent Results and Med Rec Status. Assumed care of pt at this time. Pt observed breastfeeding with visitors at bedside. 1-: RN attempts to stand pt up at the bedside. Pt right leg continues to remain heavy. RN takes pt to BR in wheelchair. Pt feels dizzy and faint. RN calls for help. Eric Hastings 1 RN come to [facility] to help RN transfer pt back to bed. Pt is back in bed. RN straight caths 500ml of concentrated urine from pt. Pt states she is feeling better at this time. 2023: RN at bedside to perform fundal exam.    2045: RN at bedside for purposeful rounding. 2038: TRANSFER - OUT REPORT:    Verbal report given to NEO Villalpando RN (name) on Pete Dalal  being transferred to MIU(unit) for routine progression of care       Report consisted of patients Situation, Background, Assessment and   Recommendations(SBAR). Information from the following report(s) SBAR, Kardex, Intake/Output, MAR, Accordion, Recent Results and Med Rec Status was reviewed with the receiving nurse. Lines:   Peripheral IV 02/07/18 Left Hand (Active)   Site Assessment Clean, dry, & intact 2/8/2018  7:06 PM   Phlebitis Assessment 0 2/8/2018  7:06 PM   Infiltration Assessment 0 2/8/2018  7:06 PM   Dressing Status Clean, dry, & intact 2/8/2018  7:06 PM   Dressing Type Transparent;Tape 2/8/2018  7:06 PM   Hub Color/Line Status Pink;Capped; Patent 2/8/2018  7:06 PM   Action Taken Open ports on tubing capped 2/8/2018  7:06 PM   Alcohol Cap Used Yes 2/8/2018  7:06 PM        Opportunity for questions and clarification was provided.       Patient transported with:   Registered Nurse  Tech

## 2018-02-09 NOTE — ROUTINE PROCESS
Bedside and Verbal shift change report given to Steve Corley RN (oncoming nurse) by Jozef Adame RN (offgoing nurse). Report included the following information SBAR, Kardex, Intake/Output, MAR and Recent Results.

## 2018-02-09 NOTE — DISCHARGE SUMMARY
Obstetrical Discharge Summary     Name: Gustavo Pascal MRN: [id number]  SSN: [id number]    YOB: 1995  Age: 23 y.o. Sex: female      Admit Date: 2018    Discharge Date: 2/10/2018     Admitting Physician: Cyndi Jaquez MD     Attending Physician:  Cyndi Jaquez MD     Admission Diagnoses: Pregnancy;Pregnancy    Procedures for this admission:     Discharge Diagnoses:   Information for the patient's :  Jayjay Arcos, Female [[id number]]   Delivery of a 9 lb 10.3 oz (4.375 kg) female infant via Vaginal, Spontaneous Delivery on 2018 at 5:33 PM  by . Apgars were 7 and 9. Discharge Condition: good    Discharge disposition: Home Narcotic pain medication    Hospital Course: Normal hospital course following the delivery. Patient Instructions:   Current Discharge Medication List      START taking these medications    Details   HYDROcodone-acetaminophen (NORCO) 5-325 mg per tablet 1-2 tabs by mouth every 4-6 hours as needed for pain  Qty: 10 Tab, Refills: 0    Associated Diagnoses: Postpartum pain         CONTINUE these medications which have NOT CHANGED    Details   PRENATAL WDVY24/UKHJ FUM/FOLIC (PRENATAL VITAMIN) 27 mg iron- 800 mcg tab tablet Take 1 Tab by mouth. Associated Diagnoses: Encounter for supervision of other normal pregnancy in second trimester      AMBULATORY BREAST PUMP Double electric breast pump, dual setup  Z39.1  ELVIA=18  Qty: 1 Units, Refills: 0             Reference my discharge instructions.     Follow-up Appointments   Procedures    FOLLOW UP VISIT Appointment in: 6 Weeks     Standing Status:   Standing     Number of Occurrences:   1     Order Specific Question:   Appointment in     Answer:   6 Weeks        Signed By:  Cyndi Jaquez MD     2018

## 2018-02-09 NOTE — PROGRESS NOTES
2145: Pt up from L&D. Shift change report and assessment at this time. Oriented to unit. 2210: Lights dimmed and more blankets brought to patient. Plan to call out within one hour for infant feeding and ambulation to bathroom. 2330: Pt called out. Ambulated well to bathroom. Able to void 250. Educated on [employer]. Pt ambulating well, with feeling in both legs. Advised that she may ambulate without assistance unless feeling light-headed, dizzy, or unsure. Pt to get up slowly and call for help if need. She agrees and indicates understanding. Walked back to bed and assisted with latching infant for breastfeeding. 0045: In bed resting comfortably. States that infant fed for 15 minutes. 7353: In bed sleeping. Infant in bassinet sleeping. 0230: In bed resting. Advised that she should start infant feeding. She agrees and states that her alarm just went off. Expressed concerns about feeling the urge to have a bowel movement. Educated regarding not straining, ect. She agrees and indicates understanding. To feed infant then call out for reassessment. 0427: Called out stating that she was feeling lightheaded in the bathroom. Upon entering, the patient has good color, appears to be shaking, but states that she is cold. She voided 600 of clear yellow urine and passed two small stools. She states that she performed kishore care and when she stood to place her pad in her underwear, she began to feel slightly lightheaded so she sat down and called out. No clots noted. Bleeding scant. Cleaned up and returned to bed with nurse assistance. Fundus is firm below 2, midline with scant bleeding. Will continue to monitor. Vitals are normal aside from a slightly increased pulse, but patient is shaking due to being cold. Will recheck vitals to verify. 0345: Given Motrin. Vitals are normal. Infant to nursery, mom to rest.   0430: In bed sleeping. Infant in nursery. 0530: Infant taken back to mom for feeding. 0600:  Mother in bed resting. States that she has not yet attempted to breastfeed. I advised her that since the last feeding was very short and only 11 drops, that the 2 hour denilson would be a better time to start. She agrees and indicates understanding. Given infant to feed. 5748: In bed holding infant. 8436: Bedside and Verbal shift change report given to Raford Cushing, RN (oncoming nurse) by Bettina Long RN (offgoing nurse). Report given with SBAR, Kardex, Intake/Output and MAR.

## 2018-02-09 NOTE — PROGRESS NOTES
1900: Bedside and Verbal shift change report given to NEO Coates RN (oncoming nurse) by NEO Brooks RN (offgoing nurse). Report included the following information SBAR, Kardex, Intake/Output, MAR, Accordion, Recent Results and Med Rec Status. Assumed care of pt at this time. Pt observed breastfeeding with visitors at bedside. 1930: RN attempts to stand pt up at the bedside. Pt right leg continues to remain heavy. RN takes pt to BR in wheelchair. Pt feels dizzy and faint. RN calls for help. Eric Hastings 1 RN come to [facility] to help RN transfer pt back to bed. Pt is back in bed. RN straight caths 500ml of concentrated urine from pt. Pt states she is feeling better at this time. 2023: RN at bedside to perform fundal exam.    2045: RN at bedside for purposeful rounding. 2120: Cassandra Cobia performed on pt before helping pt to the wheelchair to move upstairs. 2138: TRANSFER - OUT REPORT:    Verbal report given to NEO Huggins RN (name) on Lionel Wyman  being transferred to MIU(unit) for routine progression of care       Report consisted of patients Situation, Background, Assessment and   Recommendations(SBAR). Information from the following report(s) SBAR, Kardex, Intake/Output, MAR, Accordion, Recent Results and Med Rec Status was reviewed with the receiving nurse. Lines:   Peripheral IV 02/07/18 Left Hand (Active)   Site Assessment Clean, dry, & intact 2/8/2018  7:06 PM   Phlebitis Assessment 0 2/8/2018  7:06 PM   Infiltration Assessment 0 2/8/2018  7:06 PM   Dressing Status Clean, dry, & intact 2/8/2018  7:06 PM   Dressing Type Transparent;Tape 2/8/2018  7:06 PM   Hub Color/Line Status Pink;Capped; Patent 2/8/2018  7:06 PM   Action Taken Open ports on tubing capped 2/8/2018  7:06 PM   Alcohol Cap Used Yes 2/8/2018  7:06 PM        Opportunity for questions and clarification was provided.       Patient transported with:   Registered Nurse  Tech

## 2018-02-09 NOTE — PROGRESS NOTES
Patient called out from bathroom, complaining of \"feeling dizzy\" Primary RN, [employer]; Iain Holloway RN and Jay Bacon RN ran down to room to assist patient. Patient on toilet, talking to staff, vitals taken (see doc flowsheet), feeling \"better now\", scant bleeding,no clots noted in toilet, voided 600cc and had a bowel movement. Patient assisted back to bed and advised to call out for further assistance.

## 2018-02-09 NOTE — LACTATION NOTE
Tonya Bello Lactation Consultant Signed  Progress Notes Date of Service: 02/09/18 6370         Problem: Lactation Care Plan  Goal: *Infant latching appropriately  Outcome: Progressing Towards Goal  Pt will successfully establish breastfeeding by feeding in response to infant's early feeding cues and/or to offer breast every 2-3 hours. Ways to obtain a deep latch and seek comfortable positioning shared, aware to keep log of feedings/output. Goal: *Weight loss less than 10% of birth weight  Outcome: Progressing Towards Goal     Encouraged mom to attempt feeding with baby led feeding cues. Just as sucking on fingers, rooting, mouthing. Looking for 8-12 feedings in 24 hours. Don't limit baby at breast, allow baby to come of breast on it's own. Baby may want to feed  often and may increase number of feedings on second day of life. Skin to skin encouraged.       If baby doesn't nurse,  Mom should  hand express  10-20 drops of colostrum and drip into baby's mouth, or give to baby by finger feeding, cup feeding, or spoon feeding at least every 2-3 hours.      Problem: Patient Education: Go to Patient Education Activity  Goal: Patient/Family Education  Outcome: Progressing Towards Goal  Discussed with mother her plan for feeding. Reviewed the benefits of exclusive breast milk feeding during the hospital stay. Informed her of the risks of using formula to supplement in the first few days of life as well as the benefits of successful breast milk feeding; referred her to the Breastfeeding booklet about this information. She acknowledges understanding of information reviewed and states that it is her plan to breastfeed her infant. Will support her choice and offer additional information as needed.      Hand Expression Education:  Mom taught how to manually hand express her colostrum.   Emphasized the importance of providing infant with valuable colostrum as infant rests skin to skin at breast.  Aware to avoid extended periods of non-feeding. Aware to offer 10-20+ drops of colostrum every 2-3 hours until infant is latching and nursing effectively. Taught the rationale behind this low tech but highly effective evidence based practice.     Comments: Pt will successfully establish breastfeeding by feeding in response to early feeding cues   or wake every 3h, will obtain deep latch, and will keep log of feedings/output.   Taught to BF at hunger cues and or q 2-3 hrs and to offer 10-20 drops of hand expressed colostrum at any non-feeds.       Breast Assessment  Left Breast: Medium, Large  Left Nipple: Everted, Intact  Right Breast: Medium, Large  Right Nipple: Everted, Intact  Breast- Feeding Assessment  Attends Breast-Feeding Classes: Yes  Breast-Feeding Experience: No  Breast Trauma/Surgery: No  Type/Quality: Good  Lactation Consultant Visits  Breast-Feedings: Good  (Baby was latched on well during visit to left breast and feeding vigrously. )  Mother/Infant Observation  Mother Observation: Alignment, Breast comfortable, Close hold, Holds breast, Recognizes feeding cues  Infant Observation: Audible swallows, Frenulum checked, Latches nipple and aereolae, Lips flanged, lower, Lips flanged, upper, Feeding cues, Opens mouth, Relaxed after feeding, Rhythmic suck  LATCH Documentation  Latch: Grasps breast, tongue down, lips flanged, rhythmic sucking  Audible Swallowing: A few with stimulation  Type of Nipple: Everted (after stimulation)  Comfort (Breast/Nipple): Soft/non-tender  Hold (Positioning): Full assist, teach one side, mother does other, staff holds  [facility] Score: 8

## 2018-02-09 NOTE — PROGRESS NOTES
Post-Partum Day Number 1 Progress Note      Patient doing well post-partum without significant complaint. She is voiding without difficulty, she reports normal lochia. She is ambulatiing without dizziness. Her pain is well controlled with oral pain medication. She is tolerating general diet. Vitals:  Patient Vitals for the past 8 hrs:   BP Temp Pulse Resp   18 0534 111/55 98.2 °F (36.8 °C) 79 16     Temp (24hrs), Av.4 °F (36.9 °C), Min:97.8 °F (36.6 °C), Max:99.1 °F (37.3 °C)        Exam:  Patient without distress. Lungs: CTA bilaterally               CV: regular rate/rhythm, no rubs or gallops, no murmur               Abdomen soft, nontender, nondistended, normal bowel sounds               Uterus: fundus firm at level of umbilicus, nontender               Lower extremities are negative for cords or tenderness, 1+ swelling. Labs: No results found for this or any previous visit (from the past 24 hour(s)). Assessment and Plan:   Postpartum Day #1 S/P . Doing well.    - routine care   - anticipate discharge in AM  - Norco  #10  - f/u 6wk PP visit, sooner prn

## 2018-02-10 VITALS
RESPIRATION RATE: 16 BRPM | DIASTOLIC BLOOD PRESSURE: 62 MMHG | BODY MASS INDEX: 36.32 KG/M2 | HEART RATE: 88 BPM | TEMPERATURE: 98 F | SYSTOLIC BLOOD PRESSURE: 120 MMHG | OXYGEN SATURATION: 99 % | WEIGHT: 226 LBS | HEIGHT: 66 IN

## 2018-02-10 PROCEDURE — 74011250637 HC RX REV CODE- 250/637: Performed by: OBSTETRICS & GYNECOLOGY

## 2018-02-10 RX ADMIN — IBUPROFEN 800 MG: 800 TABLET ORAL at 04:31

## 2018-02-10 RX ADMIN — IBUPROFEN 800 MG: 800 TABLET ORAL at 11:42

## 2018-02-10 NOTE — DISCHARGE INSTRUCTIONS
POST DELIVERY DISCHARGE INSTRUCTIONS    Name: Mario Nicholas  YOB: 1995      General:     Diet/Diet Restrictions:  Eight 8-ounce glasses of fluid daily (primarily water); avoid excessive caffeine intake. Meals/snacks as desired which are high in fiber and complex carbohydrates and low in fat and cholesterol. Physical Activity / Restrictions / Safety:     Avoid heavy lifting, no more that 15 lbs. For 2-3 weeks; No driving while taking narcotic pain medication. Post  patients should not drive until pain free. No intercourse until seen for your 6 week postpartum visit, no douching or tampon use. No swimming or tub baths for 6 weeks. May resume exercise in 4-6 weeks. Discharge Instructions/Special Treatment/Home Care Needs:     Continue prenatal vitamins. Continue to use squirt bottle with warm water on your episiotomy after each bathroom use until bleeding stops. If steri-strips applied to your incision, remove in 14 days. Take stool softeners daily. Call your doctor for the following:     Fever over 101 degrees by mouth. Vaginal bleeding heavier than a normal menstrual period or lost larger than a golf ball. Red streaks or increased swelling of legs, painful red streaks on your breast.  Painful urination, or increased pain, redness or discharge with your incision. Pain Management:     Pain Management:   Take Acetaminophen (Tylenol) or Ibuprofen (Advil, Motrin), as directed for pain. Use a warm Sitz bath 3 times daily to relieve episiotomy or hemorrhoidal discomfort. Heating pad to  incision as needed. For hemorrhoidal discomfort, use Tucks and Anusol cream as needed and directed.     Follow-Up Care:     Appointment with MD:   Follow-up Appointments   Procedures    FOLLOW UP VISIT Appointment in: 6 Weeks     Standing Status:   Standing     Number of Occurrences:   1     Order Specific Question:   Appointment in     Answer:   6 Weeks     Telephone number: [phone]    Signed By: Cece [address], MD                                                                                                   Date: 2/9/2018 Time: 12:23 PM

## 2018-02-10 NOTE — PROGRESS NOTES
2045: Bedside and Verbal shift change report given to Trenton Villarreal RN (oncoming nurse) by Rosa Marks RN (offgoing nurse). Report given with SBAR, Kardex, Intake/Output and MAR.

## 2018-02-10 NOTE — PROGRESS NOTES
Bedside and Verbal shift change report given to Chip Jiménez RN (oncoming nurse) by Ahmet Herrera RN (offgoing nurse). Report given with SBAR, Kardex, Intake/Output and MAR.

## 2018-02-10 NOTE — ROUTINE PROCESS
Patient off unit in stable condition via wheelchair with volunteers for discharge home per Dr. Shanell Davison . Patient is to follow-up in 6 weeks. Prescriptions given to patient ([address]). Infant in car seat with mother.

## 2018-02-10 NOTE — LACTATION NOTE
sabine Hayes General Lactation Consultant Signed  Progress Notes Date of Service: 02/10/18 0915         Problem: Lactation Care Plan  Goal: *Infant latching appropriately  Outcome: Resolved/Met Date Met: 02/10/18  Pt will successfully establish breastfeeding by feeding in response to infant's early feeding cues and/or to offer breast every 2-3 hours. Ways to obtain a deep latch and seek comfortable positioning shared, aware to keep log of feedings/output. Goal: *Weight loss less than 10% of birth weight  Outcome: Resolved/Met Date Met: 02/10/18  Infant weight loss is -6.1%. Reviewed breastfeeding basics:  Supply and demand, breastfeed baby 8-12 times in 24 hr.,   stomach size, early  Feeding cues, skin to skin, positioning and baby led latch-on, assymetrical latch with signs of good, deep latch vs shallow, feeding frequency and duration, and log sheet for tracking infant feedings and output. Breastfeeding Booklet and Warm line information given. Discussed typical  weight loss and the importance of infant weight checks with pediatrician 1-2 post discharge. Baby has a pediatric appointment on 18,     Problem: Patient Education: Go to Patient Education Activity  Goal: Patient/Family Education  Outcome: Resolved/Met Date Met: 02/10/18  LC discussed the following:     Engorgement Care Guidelines:  Reviewed how milk is made and normal phases of milk production. Taught care of engorged breasts - frequent breastfeeding encouraged, cool packs and motrin as tolerated. Anticipatory guidance shared.      Care for sore/tender nipples discussed:  ways to improve positioning and latch practiced and discussed, hand express colostrum after feedings and let air dry, light application of lanolin, hydrogel pads, seek comfortable laid back feeding position, start feedings on least sore side first.     Discussed eating a healthy diet. Instructed mother to eat a variety of foods in order to get a well balanced diet. She should consume an extra 500 calories per day (more than her non-pregnant requirement.) These extra calories will help provide energy needed for optimal breast milk production. Mother also encouraged to \"drink to thirst\" and it is recommended that she drink fluids such as water, fruit/vegetable juice. Nutritious snacks should be available so that she can eat throughout the day to help satisfy her hunger and maintain a good milk supply.     Chart shows numerous feedings, void, stool WNL. Discussed importance of monitoring outputs and feedings on first week of life. Discussed ways to tell if baby is  getting enough breast milk, ie  voids and stools, change in color of stool, and return to birth wt within 2 weeks. Follow up with pediatrician visit for weight check in 1-2 days (per AAP guidelines.)  Encouraged to call Warm Line  [phone] or [facility] at [phone] for any questions/problems that arise. Mother also given breastfeeding support group dates and times for any future needs     Comments: Pt will successfully establish breastfeeding by feeding in response to early feeding cues   or wake every 3h, will obtain deep latch, and will keep log of feedings/output. Taught to BF at hunger cues and or q 2-3 hrs and to offer 10-20 drops of hand expressed colostrum at any non-feeds.       Breast Assessment  Left Breast: Medium, Large  Left Nipple: Everted, Intact  Right Breast: Medium, Large  Right Nipple: Everted, Intact  Breast- Feeding Assessment  Attends Breast-Feeding Classes: Yes  Breast-Feeding Experience: No  Breast Trauma/Surgery: No  Type/Quality: Good (Mother states baby has been latching on well and breastfeeding well last night and this morning)  Lactation Consultant Visits  Breast-Feedings:  (Baby in nursery for assessement. [facility] at [facility] for [facility] min.  Mother to call [phone] if baby feeds again before discharge.  )  Mother/Infant Observation  Mother Observation: Alignment, Breast comfortable, Close hold, Holds breast, Recognizes feeding cues  Infant Observation: Audible swallows, Frenulum checked, Latches nipple and aereolae, Lips flanged, lower, Lips flanged, upper, Feeding cues, Opens mouth, Relaxed after feeding, Rhythmic suck  LATCH Documentation  Latch: Grasps breast, tongue down, lips flanged, rhythmic sucking  Audible Swallowing: A few with stimulation  Type of Nipple: Everted (after stimulation)  Comfort (Breast/Nipple): Soft/non-tender  Hold (Positioning): Full assist, teach one side, mother does other, staff holds  [facility] Score: 8

## 2018-02-10 NOTE — PROGRESS NOTES
Post-Partum Day Number 2 Progress Note    [place]     Information for the patient's :  Zayda Simon, Female [[id number]]   Vaginal, Spontaneous Delivery   Patient doing well without significant complaint. Voiding without difficulty, normal lochia. Vitals:  Visit Vitals    /62 (BP 1 Location: Right arm, BP Patient Position: During activity)    Pulse 88    Temp 98 °F (36.7 °C)    Resp 16    Ht 5' 6\" (1.676 m)    Wt 226 lb (102.5 kg)    LMP 2017    SpO2 99%    Breastfeeding Unknown    BMI 36.48 kg/m2     Temp (24hrs), Av.9 °F (36.6 °C), Min:97.8 °F (36.6 °C), Max:98 °F (36.7 °C)      Exam:         Patient without distress. Abdomen soft, fundus firm, nontender                 ower extremities are negative for swelling, cords or tenderness. Labs:     Lab Results   Component Value Date/Time    WBC 10.0 2018 12:17 PM    WBC 10.2 2017 11:21 AM    HGB 11.2 (L) 2018 12:17 PM    HGB 11.4 2017 11:21 AM    HCT 35.2 2018 12:17 PM    HCT 32.9 (L) 2017 11:21 AM    PLATELET 064 9505 12:17 PM    PLATELET 179  11:21 AM    Hgb, External 11.4 2017    Hgb, External 12.6 2017    Hct, External 32.9 2017    Hct, External 36.3 2017    Platelet cnt., External 232 2017    Platelet cnt., External 261 2017       No results found for this or any previous visit (from the past 24 hour(s)). Assessment: Doing well, post partum day 2    Plan:   1. Discharge home today  2. Follow up in office in 6 weeks with Mirna Zarate MD  3. Post partum activity advised, diet as tolerated  4.  Discharge Medications: ibuprofen, percocet and medications prior to admission

## 2018-02-10 NOTE — LACTATION NOTE
This note was copied from a baby's chart. Mother called LC to check latch. Baby had a narrow gape when opening mouth and mother felt like baby was gumming down on nipple. LC encouraged mother to break latch and try again and to wait until baby opens her mouth wide prior to offering breast. Mother able to feel a difference in the latch. Baby had a good rhythmic sucking pattern and audible swallows heard. Mother has LC# if needed.

## 2018-03-20 ENCOUNTER — OFFICE VISIT (OUTPATIENT)
Dept: OBGYN CLINIC | Age: 23
End: 2018-03-20

## 2018-03-20 VITALS
DIASTOLIC BLOOD PRESSURE: 67 MMHG | BODY MASS INDEX: 33.59 KG/M2 | SYSTOLIC BLOOD PRESSURE: 99 MMHG | WEIGHT: 209 LBS | HEART RATE: 85 BPM | HEIGHT: 66 IN

## 2018-03-20 DIAGNOSIS — L92.9 GRANULATION TISSUE AT OBSTETRICAL LACERATION SITE: ICD-10-CM

## 2018-03-20 DIAGNOSIS — Z87.42 HISTORY OF IRREGULAR MENSTRUAL BLEEDING: ICD-10-CM

## 2018-03-20 DIAGNOSIS — Z87.42 HISTORY OF DYSMENORRHEA: ICD-10-CM

## 2018-03-20 PROBLEM — Z34.90 PREGNANCY: Status: RESOLVED | Noted: 2017-09-08 | Resolved: 2018-03-20

## 2018-03-20 NOTE — PROGRESS NOTES
Postpartum evaluation    Mario Nicholas is a 23 y.o. female who presents for a postpartum exam.     She is now six weeks post normal spontaneous vaginal delivery on 2/8/18, baby girl, Beltran. Her baby is doing well. She has had no menses since delivery. She has had the following significant problems since her delivery: Noticed when she squats, she gets very sore. The patient is breastfeeding with some difficulty, also giving bottle. Had to start supplementing d/t wt loss. Her wt is better. Was nursing first, then giving bottle, but exhausting. Now nursing about 3x/d. The patient would like to use birth control, discuss options. Was on continuous OCPs in past for irregular cycles and dysmenorrhea. Did well. Took Junel continously. Interested in [facility]. She is currently taking: PNVs.     She is due for her next AE due after 7/7/18.      Visit Vitals    BP 99/67    Pulse 85    Ht 5' 6\" (1.676 m)    Wt 209 lb (94.8 kg)    Breastfeeding No    BMI 33.73 kg/m2       PHYSICAL EXAMINATION    Constitutional  · Appearance: well-nourished, well developed, alert, in no acute distress    HENT  · Head and Face: appears normal    Neck  · Inspection/Palpation: normal appearance, no masses or tenderness  · Lymph Nodes: no lymphadenopathy present  · Thyroid: gland size normal, nontender, no nodules or masses present on palpation    Breasts  · Inspection of Breasts: breasts symmetrical, no skin changes, no discharge present, nipple appearance normal, no skin retraction present  · Palpation of Breasts and Axillae: no masses present on palpation, no breast tenderness  · Axillary Lymph Nodes: no lymphadenopathy present    Gastrointestinal  · Abdominal Examination: abdomen non-tender to palpation, normal bowel sounds, no masses present  · Liver and spleen: no hepatomegaly present, spleen not palpable  · Hernias: no hernias identified    Genitourinary  · External Genitalia: stitches still visible just inside introitus with some granulation tissue. Otherwise, normal appearance for age, no discharge present, no tenderness present, no inflammatory lesions present, no masses present, no atrophy present  · Vagina: normal vaginal vault without central or paravaginal defects, no discharge present, no inflammatory lesions present, no masses present  · Bladder: non-tender to palpation  · Urethra: appears normal  · Cervix: normal   · Uterus: normal size, shape and consistency  · Adnexa: no adnexal tenderness present, no adnexal masses present  · Perineum: perineum within normal limits, no evidence of trauma, no rashes or skin lesions present  · Anus: anus within normal limits, no hemorrhoids present  · Inguinal Lymph Nodes: no lymphadenopathy present    Skin  · General Inspection: no rash, no lesions identified    Neurologic/Psychiatric  · Mental Status:  · Orientation: grossly oriented to person, place and time  · Mood and Affect: mood normal, affect appropriate    Assessment:  Normal postpartum check  H/o irreg cycles, dysmenorrhea  Granulation tissue    Plan:  RTO for AE. Due 7/2018  Wants Mirena. R/B reviewed including perforation, irregular bleeding, ectopic pregnancy. Insertion CD#1-5. Recommend Aleve 2 tabs 2 hrs prior to appointment. Sitz bath for granulation tissue, can RTO for silver nitrate.

## 2018-03-20 NOTE — PATIENT INSTRUCTIONS
After Your Delivery (the Postpartum Period): Care Instructions  Your Care Instructions    Congratulations on the birth of your baby. Like pregnancy, the  period can be a time of excitement, freddy, and exhaustion. You may look at your wondrous little baby and feel happy. You may also be overwhelmed by your new sleep hours and new responsibilities. At first, babies often sleep during the days and are awake at night. They do not have a pattern or routine. They may make sudden gasps, jerk themselves awake, or look like they have crossed eyes. These are all normal, and they may even make you smile. In these first weeks after delivery, try to take good care of yourself. It may take 4 to 6 weeks to feel like yourself again, and possibly longer if you had a  birth. You will likely feel very tired for several weeks. Your days will be full of ups and downs, but lots of freddy as well. Follow-up care is a key part of your treatment and safety. Be sure to make and go to all appointments, and call your doctor if you are having problems. It's also a good idea to know your test results and keep a list of the medicines you take. How can you care for yourself at home? Take care of your body after delivery  · Use pads instead of tampons for the bloody flow that may last as long as 2 weeks. · Ease cramps with ibuprofen (Advil, Motrin). · Ease soreness of hemorrhoids and the area between your vagina and rectum with ice compresses or witch hazel pads. · Ease constipation by drinking lots of fluid and eating high-fiber foods. Ask your doctor about over-the-counter stool softeners. · Cleanse yourself with a gentle squeeze of warm water from a bottle instead of wiping with toilet paper. · Take a sitz bath in warm water several times a day. · Wear a good nursing bra. Ease sore and swollen breasts with warm, wet washcloths. · If you are not breastfeeding, use ice rather than heat for breast soreness.   · Your period may not start for several months if you are breastfeeding. You may bleed more, and longer at first, than you did before you got pregnant. · Wait until you are healed (about 4 to 6 weeks) before you have sexual intercourse. Your doctor will tell you when it is okay to have sex. · Try not to travel with your baby for 5 or 6 weeks. If you take a long car trip, make frequent stops to walk around and stretch. Avoid exhaustion  · Rest every day. Try to nap when your baby naps. · Ask another adult to be with you for a few days after delivery. · Plan for  if you have other children. · Stay flexible so you can eat at odd hours and sleep when you need to. Both you and your baby are making new schedules. · Plan small trips to get out of the house. Change can make you feel less tired. · Ask for help with housework, cooking, and shopping. Remind yourself that your job is to care for your baby. Know about help for postpartum depression  · \"Baby blues\" are common for the first 1 to 2 weeks after birth. You may cry or feel sad or irritable for no reason. · Rest whenever you can. Being tired makes it harder to handle your emotions. · Go for walks with your baby. · Talk to your partner, friends, and family about your feelings. · If your symptoms last for more than a few weeks, or if you feel very depressed, ask your doctor for help. · Postpartum depression can be treated. Support groups and counseling can help. Sometimes medicine can also help. Stay healthy  · Eat healthy foods so you have more energy, make good breast milk, and lose extra baby pounds. · If you breastfeed, avoid alcohol and drugs. Stay smoke-free. If you quit during pregnancy, congratulations. · Start daily exercise after 4 to 6 weeks, but rest when you feel tired. · Learn exercises to tone your belly. Do Kegel exercises to regain strength in your pelvic muscles. You can do these exercises while you stand or sit.   ¨ Squeeze the same muscles you would use to stop your urine. Your belly and thighs should not move. ¨ Hold the squeeze for 3 seconds, and then relax for 3 seconds. ¨ Start with 3 seconds. Then add 1 second each week until you are able to squeeze for 10 seconds. ¨ Repeat the exercise 10 to 15 times for each session. Do three or more sessions each day. · Find a class for new mothers and new babies that has an exercise time. · If you had a  birth, give yourself a bit more time before you exercise, and be careful. When should you call for help? Call 911 anytime you think you may need emergency care. For example, call if:  ? · You passed out (lost consciousness). ?Call your doctor now or seek immediate medical care if:  ? · You have severe vaginal bleeding. This means you are passing blood clots and soaking through a pad each hour for 2 or more hours. ? · You are dizzy or lightheaded, or you feel like you may faint. ? · You have a fever. ? · You have new belly pain, or your pain gets worse. ? Watch closely for changes in your health, and be sure to contact your doctor if:  ? · Your vaginal bleeding seems to be getting heavier. ? · You have new or worse vaginal discharge. ? · You feel sad, anxious, or hopeless for more than a few days. ? · You do not get better as expected. Where can you learn more? Go to http://ricci-so.info/. Enter A461 in the search box to learn more about \"After Your Delivery (the Postpartum Period): Care Instructions. \"  Current as of: 2017  Content Version: 11.4  © 6512-9165 [employer]. Care instructions adapted under license by [employer] (which disclaims liability or warranty for this information). If you have questions about a medical condition or this instruction, always ask your healthcare professional. [address] any warranty or liability for your use of this information.

## 2018-05-21 ENCOUNTER — OFFICE VISIT (OUTPATIENT)
Dept: OBGYN CLINIC | Age: 23
End: 2018-05-21

## 2018-05-21 VITALS
SYSTOLIC BLOOD PRESSURE: 102 MMHG | WEIGHT: 212 LBS | BODY MASS INDEX: 34.07 KG/M2 | HEART RATE: 66 BPM | HEIGHT: 66 IN | DIASTOLIC BLOOD PRESSURE: 61 MMHG

## 2018-05-21 DIAGNOSIS — Z32.02 NEGATIVE PREGNANCY TEST: ICD-10-CM

## 2018-05-21 DIAGNOSIS — N91.5 OLIGOMENORRHEA, UNSPECIFIED TYPE: ICD-10-CM

## 2018-05-21 DIAGNOSIS — Z30.430 ENCOUNTER FOR IUD INSERTION: Primary | ICD-10-CM

## 2018-05-21 LAB
HCG URINE, QL. (POC): NEGATIVE
VALID INTERNAL CONTROL?: YES

## 2018-05-21 NOTE — PATIENT INSTRUCTIONS
Intrauterine Device (IUD) Insertion: Care Instructions  Your Care Instructions    The intrauterine device (IUD) is a very effective method of birth control. It is a small, plastic, T-shaped device that contains copper or hormones. The doctor inserts the IUD into your uterus. A plastic string tied to the end of the IUD hangs down through the cervix into the vagina. There are two types of IUDs. The copper IUD is effective for up to 10 years. The hormonal IUD is effective for either 3 years or 5 years, depending on which IUD is used. The hormonal IUD also reduces menstrual bleeding and cramping. Both types of IUD damage or kill the man's sperm. This means that the woman's egg does not join with the sperm. IUDs also change the lining of the uterus so that the egg does not lodge there. The IUD is most likely to work well for women who have been pregnant before. Some women who have never been pregnant have more trouble keeping the IUD in the uterus. They also may have more pain and cramping after insertion. Follow-up care is a key part of your treatment and safety. Be sure to make and go to all appointments, and call your doctor if you are having problems. It's also a good idea to know your test results and keep a list of the medicines you take. How can you care for yourself at home? · You may experience some mild cramping and light bleeding (spotting) for 1 or 2 days. Use a hot water bottle or a heating pad set on low on your belly for pain. · Take an over-the-counter pain medicine, such as acetaminophen (Tylenol), ibuprofen (Advil, Motrin), and naproxen (Aleve) if needed. Read and follow all instructions on the label. · Do not take two or more pain medicines at the same time unless the doctor told you to. Many pain medicines have acetaminophen, which is Tylenol. Too much acetaminophen (Tylenol) can be harmful. · Check the string of your IUD after every period.  To do this, insert a finger into your vagina and feel for the cervix, which is at the top of the vagina and feels harder than the rest of your vagina. You should be able to feel the thin, plastic string coming out of the opening of your cervix. If you cannot feel the string, use another form of birth control and make an appointment with your doctor to have the string checked. · If the IUD comes out, save it and call your doctor. Be sure to use another form of birth control while the IUD is out. · Use latex condoms to protect against sexually transmitted infections (STIs), such as gonorrhea and chlamydia. An IUD does not protect you from STIs. Having one sex partner (who does not have STIs and does not have sex with anyone else) is a good way to avoid STIs. When should you call for help? Call 911 anytime you think you may need emergency care. For example, call if:  ? · You passed out (lost consciousness). ? · You have sudden, severe pain in your belly or pelvis. ?Call your doctor now or seek immediate medical care if:  ? · You have new belly or pelvic pain. ? · You have severe vaginal bleeding. This means that you are soaking through your usual pads or tampons each hour for 2 or more hours. ? · You are dizzy or lightheaded, or you feel like you may faint. ? · You have a fever and pelvic pain or vaginal discharge. ? · You have pelvic pain that is getting worse. ? Watch closely for changes in your health, and be sure to contact your doctor if:  ? · You cannot feel the string, or the IUD comes out. ? · You feel sick to your stomach, or you vomit. ? · You think you may be pregnant. Where can you learn more? Go to http://ricci-so.info/. Enter W283 in the search box to learn more about \"Intrauterine Device (IUD) Insertion: Care Instructions. \"  Current as of: March 16, 2017  Content Version: 11.4  © 7562-3406 [employer].  Care instructions adapted under license by [employer] (which disclaims liability or warranty for this information). If you have questions about a medical condition or this instruction, always ask your healthcare professional. [place] [address] any warranty or liability for your use of this information.

## 2018-05-21 NOTE — PROGRESS NOTES
Oligomenorrhea  -> Mirena IUD today (see note below      TSH, PRL            [facility]  OFFICE PROCEDURE PROGRESS NOTE        Chart reviewed for the following:   Malick AZEVEDO, have reviewed the History, Physical and updated the Allergic reactions for [address] performed immediately prior to start of procedure:   Malick AZEVEDO, have performed the following reviews on Trey Tate prior to the start of the procedure:            * Patient was identified by name and date of birth   * Agreement on procedure being performed was verified  * Risks and Benefits explained to the patient  * Procedure site verified and marked as necessary  * Patient was positioned for comfort  * Consent was signed and verified     Time: 1320      Date of procedure: 2018    Procedure performed by:  Henrietta Mathews MD    Provider assisted by: JO ANN Dupont    Patient assisted by: self    How tolerated by patient: tolerated the procedure well with no complications    Post Procedural Pain Scale: 0 - No Hurt    Comments: none            Mirena IUD INSERTION  Indications:  Trey Tate is a ,  23 y.o. female [facility] No LMP recorded. Her LMP was normal in duration and amount of flow. She  presents for insertion of an IUD. The risks, benefits and alternatives of IUD insertion were discussed in detail at last visit. She also has reviewed Mirena information. She has elected to proceed with the insertion today and she states she has no further questions. A urine pregnancy test was negative   Procedure: The pelvic exam revealed normal external genitalia. On bimanual exam the uterus was anteverted and normal in size with no tenderness present. A speculum was inserted into the vagina and the cervix was visualized. The cervix was prepped with zephiran solution.  The anterior lip of the cervix was grasped with a single toothed tenaculum after infiltrating with 2-3cc 1% lidocaine. The uterus was sounded with a flexible Mirena sound to 7 centimeters. A Mirena was then inserted without difficulty. The string was cut to 4 centimeters. She experienced a minimal  amount of cramping. Post Procedure Status:   She tolerated the procedure with mild discomfort. The patient was observed for 10 minutes after the insertion. There were no complications. Patient was discharged in stable condition. The patient received Mirena lot number [id number] and EXP: 11/2020.

## 2018-05-22 LAB
PROLACTIN SERPL-MCNC: 52.4 NG/ML (ref 4.8–23.3)
TSH SERPL DL<=0.005 MIU/L-ACNC: 2.01 UIU/ML (ref 0.45–4.5)

## 2018-07-09 ENCOUNTER — OFFICE VISIT (OUTPATIENT)
Dept: OBGYN CLINIC | Age: 23
End: 2018-07-09

## 2018-07-09 VITALS
WEIGHT: 205 LBS | DIASTOLIC BLOOD PRESSURE: 72 MMHG | SYSTOLIC BLOOD PRESSURE: 99 MMHG | BODY MASS INDEX: 32.95 KG/M2 | HEIGHT: 66 IN | HEART RATE: 83 BPM

## 2018-07-09 DIAGNOSIS — Z01.419 ENCOUNTER FOR GYNECOLOGICAL EXAMINATION: Primary | ICD-10-CM

## 2018-07-09 DIAGNOSIS — Z30.431 SURVEILLANCE OF (INTRAUTERINE) CONTRACEPTIVE DEVICE: ICD-10-CM

## 2018-07-09 DIAGNOSIS — Z11.3 SCREENING FOR VENEREAL DISEASE: ICD-10-CM

## 2018-07-09 NOTE — PROGRESS NOTES
Annual exam ages 21-44    Lyudmila Melgoza is a ,  23 y.o. female [facility] No LMP recorded. Patient is not currently having periods (Reason: IUD). , who presents for her annual checkup. Since her last annual GYN exam about one year ago on 17 at [facility] visit, she has had the following changes in her health history:  on 18 and IUD insertion 18. Spotting from insertion stopped about 1.5 wks ago, then spotting started again 3d ago. Has been sexually active since insertion, no issues with strings. Tried checking strings, couldn't find them. IUD US Report:  TRANSVAGINAL ULTRASOUND PERFORMED  UTERUS IS ANTEVERTED, NORMAL IN SIZE AND ECHOGENICITY. ENDOMETRIUM MEASURES 6MM IN THICKNESS. NO EVIDENCE OF MASSES OR ABNORMALITIES ARE SEEN. IUD IS SEEN IN THE PROPER POSITION WITHIN THE ENDOMETRIAL CAVITY IN THE UTERINE FUNDUS. RIGHT OVARY APPEARS WITHIN NORMAL LIMITS. LEFT OVARY APPEARS WITHIN NORMAL LIMITS. NO FREE FLUID SEEN IN THE CDS. ADDITIONAL CONCERNS:  She is having no significant problems. Still spotting since insertion. With regard to the Gardasil vaccine, she has not received it yet. Menstrual status:    Her periods are spotting in flow. She is using essentially none pads or tampons per day, minimal to none using Mirena. She denies dysmenorrhea. She denies premenstrual symptoms. Contraception:    The current method of family planning is IUD and abstinence. Sexual history:     She  reports that she does not currently engage in sexual activity but has had male partners.; She reports using the following method of birth control/protection: IUD. Pap and Mammogram History:    Her most recent Pap smear was normal on 17. She does not have a history of abnormal paps.     The patient has never had a mammogram.    Breast Cancer History/Substance Abuse: + Br Ca = MGM, PGM    Past Medical History:   Diagnosis Date    Chlamydia     no outbreaks in pregnancy     Encounter for insertion of mirena IUD 2018    Hx of chlamydia infection 2014    Hyperprolactinemia ([facility] [address] 2018    (2018)=52    Routine Papanicolaou smear 2017    negative (in media tab)     History reviewed. No pertinent surgical history. OB History    Para Term  AB Living   1 1 1   1   SAB TAB Ectopic Molar Multiple Live Births       0 1      # Outcome Date GA Lbr Matteo/2nd Weight Sex Delivery Anes PTL Lv   1 Term 18 41w1d 20:00 / 00:58 9 lb 10.3 oz (4.375 kg) F Vag-Spont EPIDURAL AN,Local N LAURITA          Current Outpatient Prescriptions   Medication Sig Dispense Refill    levonorgestrel (MIRENA) 20 mcg/24 hr (5 years) IUD 1 Device by IntraUTERine route once.  PRENATAL IXQQ18/LKFX FUM/FOLIC (PRENATAL VITAMIN) 27 mg iron- 800 mcg tab tablet Take 1 Tab by mouth. Allergies: Review of patient's allergies indicates no known allergies. Social History     Social History    Marital status:      Spouse name: N/A    Number of children: N/A    Years of education: N/A     Occupational History    Not on file. Social History Main Topics    Smoking status: Never Smoker    Smokeless tobacco: Never Used      Comment: Never used vapor or e-cigs     Alcohol use No    Drug use: No    Sexual activity: Not Currently     Partners: Male     Birth control/ protection: IUD     Other Topics Concern    Not on file     Social History Narrative     Tobacco History:  reports that she has never smoked. She has never used smokeless tobacco.  Alcohol Abuse:  reports that she does not drink alcohol. Drug Abuse:  reports that she does not use illicit drugs.     Patient Active Problem List   Diagnosis Code   (none) - all problems resolved or deleted     Family History   Problem Relation Age of Onset    Hypertension Mother     Breast Cancer Maternal Grandmother 54    Cancer Maternal Grandmother      Colon    Breast Cancer Paternal Grandmother 72    Cancer Paternal Grandfather 79     Liver       Review of Systems - History obtained from the patient  Constitutional: negative for weight loss, fever, night sweats  HEENT: negative for hearing loss, earache, congestion, snoring, sorethroat  CV: negative for chest pain, palpitations, edema  Resp: negative for cough, shortness of breath, wheezing  GI: negative for change in bowel habits, abdominal pain, black or bloody stools  : negative for frequency, dysuria, hematuria, vaginal discharge  MSK: negative for back pain, joint pain, muscle pain  Breast: negative for breast lumps, nipple discharge, galactorrhea  Skin :negative for itching, rash, hives  Neuro: negative for dizziness, headache, confusion, weakness  Psych: negative for anxiety, depression, change in mood  Heme/lymph: negative for bleeding, bruising, pallor    Physical Exam    Visit Vitals    BP 99/72    Pulse 83    Ht 5' 6\" (1.676 m)    Wt 205 lb (93 kg)    Breastfeeding No    BMI 33.09 kg/m2       Constitutional  · Appearance: well-nourished, well developed, alert, in no acute distress    HENT  · Head and Face: appears normal    Neck  · Inspection/Palpation: normal appearance, no masses or tenderness  · Lymph Nodes: no lymphadenopathy present  · Thyroid: gland size normal, nontender, no nodules or masses present on palpation    Chest  · Respiratory Effort: breathing unlabored  · Auscultation: normal breath sounds    Cardiovascular  · Heart:  · Auscultation: regular rate and rhythm without murmur    Breasts  · Inspection of Breasts: breasts symmetrical, no skin changes, no discharge present, nipple appearance normal, no skin retraction present  · Palpation of Breasts and Axillae: no masses present on palpation, no breast tenderness  · Axillary Lymph Nodes: no lymphadenopathy present    Gastrointestinal  · Abdominal Examination: abdomen non-tender to palpation, normal bowel sounds, no masses present  · Liver and spleen: no hepatomegaly present, spleen not palpable  · Hernias: no hernias identified    Genitourinary  · External Genitalia: normal appearance for age, no discharge present, no tenderness present, no inflammatory lesions present, no masses present, no atrophy present  · Vagina: normal vaginal vault without central or paravaginal defects, no discharge present, no inflammatory lesions present, no masses present; small amt dark blood  · Bladder: non-tender to palpation  · Urethra: appears normal  · Cervix: normal, IUD strings 3.5-4cm  · Uterus: normal size, shape and consistency  · Adnexa: no adnexal tenderness present, no adnexal masses present  · Perineum: perineum within normal limits, no evidence of trauma, no rashes or skin lesions present  · Anus: anus within normal limits, no hemorrhoids present  · Inguinal Lymph Nodes: no lymphadenopathy present    Skin  · General Inspection: no rash, no lesions identified    Neurologic/Psychiatric  · Mental Status:  · Orientation: grossly oriented to person, place and time  · Mood and Affect: mood normal, affect appropriate        Assessment & Plan:  · Routine gynecologic examination. Pap/HPV neg 7/2017. · NuSwab screening <25yo. · Doing well with Mirena. · Her current medical status is satisfactory with no evidence of significant gynecologic issues.   · Counseled re: diet, exercise, healthy lifestyle  · Return for yearly wellness visits  · Patient verbalized understanding      Orders Placed This Encounter    CT/NG/T.VAGINALIS AMPLIFICATION     Order Specific Question:   Specimen type     Answer:   Vaginal [[id number]]

## 2018-07-09 NOTE — PATIENT INSTRUCTIONS

## 2018-07-12 LAB
C TRACH RRNA SPEC QL NAA+PROBE: NEGATIVE
N GONORRHOEA RRNA SPEC QL NAA+PROBE: NEGATIVE
T VAGINALIS RRNA SPEC QL NAA+PROBE: NEGATIVE

## 2018-10-22 ENCOUNTER — HOSPITAL ENCOUNTER (EMERGENCY)
Age: 23
Discharge: HOME | End: 2018-10-22
Attending: EMERGENCY MEDICINE
Payer: COMMERCIAL

## 2018-10-22 VITALS
SYSTOLIC BLOOD PRESSURE: 126 MMHG | DIASTOLIC BLOOD PRESSURE: 73 MMHG | BODY MASS INDEX: 30.67 KG/M2 | OXYGEN SATURATION: 99 % | WEIGHT: 190 LBS | RESPIRATION RATE: 18 BRPM | TEMPERATURE: 98.1 F | HEART RATE: 75 BPM
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
## 2018-10-22 PROCEDURE — 99281 EMR DPT VST MAYX REQ PHY/QHP: CPT

## 2018-10-22 RX ORDER — NAPROXEN 500 MG/1
500 TABLET ORAL 2 TIMES DAILY WITH MEALS
Qty: 20 TAB | Refills: 0 | Status: SHIPPED | OUTPATIENT
Start: 2018-10-22 | End: 2018-11-01

## 2018-10-22 RX ORDER — CYCLOBENZAPRINE HCL 5 MG
5 TABLET ORAL
Qty: 12 TAB | Refills: 0 | Status: SHIPPED | OUTPATIENT
Start: 2018-10-22 | End: 2022-07-22
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
## 2019-07-19 ENCOUNTER — OFFICE VISIT (OUTPATIENT)
Dept: OBGYN CLINIC | Age: 24
End: 2019-07-19

## 2019-07-19 VITALS — HEIGHT: 66 IN | RESPIRATION RATE: 19 BRPM | BODY MASS INDEX: 30.53 KG/M2 | WEIGHT: 190 LBS

## 2019-07-19 DIAGNOSIS — Z01.419 WELL FEMALE EXAM WITH ROUTINE GYNECOLOGICAL EXAM: Primary | ICD-10-CM

## 2019-07-19 NOTE — PROGRESS NOTES
Annual exam ages 21-44    Rosa Reynolds is a ,  20 y.o. female [facility] No LMP recorded. (Menstrual status: IUD). , who presents for her annual checkup. Mirena 18. Had spotting for first couple of weeks -> resolved. Tried checking strings but couldn't find them. 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 Since her last annual GYN exam about one year ago, she has had the following changes in her health history: none    ADDITIONAL CONCERNS:  She is having no significant problems. Menstrual status:    Her periods are absent in flow due to the mirena    She denies dysmenorrhea. She denies premenstrual symptoms. Contraception:    The current method of family planning is IUD. Sexual history:     She  reports that she does not currently engage in sexual activity but has had partner(s) who are Male. She reports using the following method of birth control/protection: IUD. Brandan Gu Pap and Mammogram History:    Her most recent Pap smear was normal, HPV was not obtained due to her age, obtained 2017. She does not have a history of abnormal paps. The patient has never had a mammogram.    Breast Cancer History/Substance Abuse:    Past Medical History:   Diagnosis Date    Chlamydia     no outbreaks in pregnancy     Encounter for insertion of mirena IUD 2018    Hx of chlamydia infection 2014    Hyperprolactinemia ([facility] [address] 2018    (2018)=52    Routine Papanicolaou smear 2017    negative (in media tab)     No past surgical history on file.   OB History    Para Term  AB Living   1 1 1     1   SAB TAB Ectopic Molar Multiple Live Births           0 1      # Outcome Date GA Lbr Matteo/2nd Weight Sex Delivery Anes PTL Lv   1 Term 18 41w1d 20:00 / 00:58 9 lb 10.3 oz (4.375 kg) F Vag-Spont EPIDURAL AN, Local N LAURITA       Current Outpatient Medications   Medication Sig Dispense Refill    levonorgestrel (MIRENA) 20 mcg/24 hr (5 years) IUD 1 Device by IntraUTERine route once.  cyclobenzaprine (FLEXERIL) 5 mg tablet Take 1 Tab by mouth three (3) times daily as needed for Muscle Spasm(s). 12 Tab 0     Allergies: Patient has no known allergies. Social History     Socioeconomic History    Marital status:      Spouse name: Not on file    Number of children: Not on file    Years of education: Not on file    Highest education level: Not on file   Occupational History    Not on file   Social Needs    Financial resource strain: Not on file    Food insecurity:     Worry: Not on file     Inability: Not on file    Transportation needs:     Medical: Not on file     Non-medical: Not on file   Tobacco Use    Smoking status: Never Smoker    Smokeless tobacco: Never Used    Tobacco comment: Never used vapor or e-cigs    Substance and Sexual Activity    Alcohol use: No    Drug use: No    Sexual activity: Not Currently     Partners: Male     Birth control/protection: IUD   Lifestyle    Physical activity:     Days per week: Not on file     Minutes per session: Not on file    Stress: Not on file   Relationships    Social connections:     Talks on phone: Not on file     Gets together: Not on file     Attends Christian service: Not on file     Active member of club or organization: Not on file     Attends meetings of clubs or organizations: Not on file     Relationship status: Not on file    Intimate partner violence:     Fear of current or ex partner: Not on file     Emotionally abused: Not on file     Physically abused: Not on file     Forced sexual activity: Not on file   Other Topics Concern    Not on file   Social History Narrative    Not on file     Tobacco History:  reports that she has never smoked. She has never used smokeless tobacco.  Alcohol Abuse:  reports that she does not drink alcohol. Drug Abuse:  reports that she does not use drugs.     Patient Active Problem List   Diagnosis Code   (none) - all problems resolved or deleted     Family History   Problem Relation Age of Onset    Hypertension Mother     Breast Cancer Maternal Grandmother 54    Cancer Maternal Grandmother         Colon    Breast Cancer Paternal Grandmother 72    Cancer Paternal Grandfather 79        Liver       Review of Systems - History obtained from the patient  Constitutional: negative for weight loss, fever, night sweats  HEENT: negative for hearing loss, earache, congestion, snoring, sorethroat  CV: negative for chest pain, palpitations, edema  Resp: negative for cough, shortness of breath, wheezing  GI: negative for change in bowel habits, abdominal pain, black or bloody stools  : negative for frequency, dysuria, hematuria, vaginal discharge  MSK: negative for back pain, joint pain, muscle pain  Breast: negative for breast lumps, nipple discharge, galactorrhea  Skin :negative for itching, rash, hives  Neuro: negative for dizziness, headache, confusion, weakness  Psych: negative for anxiety, depression, change in mood  Heme/lymph: negative for bleeding, bruising, pallor    Physical Exam    Visit Vitals  Resp 19   Ht 5' 6\" (1.676 m)   Wt 190 lb (86.2 kg)   BMI 30.67 kg/m²       Constitutional  · Appearance: well-nourished, well developed, alert, in no acute distress    HENT  · Head and Face: appears normal    Neck  · Inspection/Palpation: normal appearance, no masses or tenderness  · Lymph Nodes: no lymphadenopathy present  · Thyroid: gland size normal, nontender, no nodules or masses present on palpation    Chest  · Respiratory Effort: breathing unlabored  · Auscultation: normal breath sounds    Cardiovascular  · Heart:  · Auscultation: regular rate and rhythm without murmur    Breasts  · Inspection of Breasts: breasts symmetrical, no skin changes, no discharge present, nipple appearance normal, no skin retraction present  · Palpation of Breasts and Axillae: no masses present on palpation, no breast tenderness  · Axillary Lymph Nodes: no lymphadenopathy present    Gastrointestinal  · Abdominal Examination: abdomen non-tender to palpation, normal bowel sounds, no masses present  · Liver and spleen: no hepatomegaly present, spleen not palpable  · Hernias: no hernias identified    Genitourinary  · External Genitalia: normal appearance for age, no discharge present, no tenderness present, no inflammatory lesions present, no masses present, no atrophy present  · Vagina: normal vaginal vault without central or paravaginal defects, no discharge present, no inflammatory lesions present, no masses present  · Bladder: non-tender to palpation  · Urethra: appears normal  · Cervix: normal; IUd string 2.5-3cm  · Uterus: normal size, shape and consistency  · Adnexa: no adnexal tenderness present, no adnexal masses present  · Perineum: perineum within normal limits, no evidence of trauma, no rashes or skin lesions present  · Anus: anus within normal limits, no hemorrhoids present  · Inguinal Lymph Nodes: no lymphadenopathy present    Skin  · General Inspection: no rash, no lesions identified    Neurologic/Psychiatric  · Mental Status:  · Orientation: grossly oriented to person, place and time  · Mood and Affect: mood normal, affect appropriate        Assessment & Plan:  · Routine gynecologic examination. Pap neg 7/2017 -<> due next year. · Her current medical status is satisfactory with no evidence of significant gynecologic issues.   · Doing well with Mirena IUD, placed 5/2018  · Counseled re: diet, exercise, healthy lifestyle  · Return for yearly wellness visits  · Patient verbalized understanding

## 2020-01-21 ENCOUNTER — TELEPHONE (OUTPATIENT)
Dept: OBGYN CLINIC | Age: 25
End: 2020-01-21

## 2020-01-21 NOTE — TELEPHONE ENCOUNTER
Patient of DM. She feels that she has more anxiety since her child was born 2 years ago.   She feels she needs to make appointment to be seen but not sure with who.

## 2020-01-21 NOTE — TELEPHONE ENCOUNTER
Recommended to see a PCP/urgent care. She said she could understand needing to pick PCP in her area to follow her for this issue and to be able to advise if needs counselor.
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
## 2022-07-21 NOTE — PROGRESS NOTES
Annual exam ages 21-44    Nando Rust is a ,  27 y.o. female WHITE/NON- No LMP recorded. (Menstrual status: IUD). , who presents for her annual checkup. Mirena 18  LV=19    Since her last annual GYN exam about three or more years ago, she has had the following changes in her health history:   - none    ADDITIONAL CONCERNS:  She is having no significant problems. Would like STD testing with swab only, declines blood work. With regard to the Gardasil vaccine, she has not received it yet. Menstrual status:    Her periods are nonexistent in flow. Contraception:    The current method of family planning is IUD. Sexual history:     She  reports that she is not currently sexually active and has had partner(s) who are male. She reports using the following method of birth control/protection: I.U.D.. Rinku Pabon Pap and Mammogram History:    Her most recent Pap smear was normal, obtained  (in media). She does not have a history of abnormal paps. The patient has never had a mammogram.    Breast Cancer History/Substance Abuse:    Past Medical History:   Diagnosis Date    Chlamydia     no outbreaks in pregnancy     Encounter for insertion of mirena IUD 2018    Hx of chlamydia infection 2014    Hyperprolactinemia ([facility] [address] 2018    (2018)=52    Routine Papanicolaou smear 2017    negative (in media tab)     No past surgical history on file. OB History    Para Term  AB Living   1 1 1     1   SAB IAB Ectopic Molar Multiple Live Births           0 1      # Outcome Date GA Lbr Matteo/2nd Weight Sex Delivery Anes PTL Lv   1 Term 18 41w1d 20:00 / 00:58 9 lb 10.3 oz (4.375 kg) F Vag-Spont EPIDURAL AN, Local N LAURITA       Current Outpatient Medications   Medication Sig Dispense Refill    levonorgestreL (MIRENA) 20 mcg/24 hours (7 yrs) 52 mg IUD 1 Device by IntraUTERine route once.       cyclobenzaprine (FLEXERIL) 5 mg tablet Take 1 Tab by mouth three (3) times daily as needed for Muscle Spasm(s). (Patient not taking: Reported on 7/22/2022) 12 Tab 0     Allergies: Patient has no known allergies. Social History     Socioeconomic History    Marital status:      Spouse name: Not on file    Number of children: Not on file    Years of education: Not on file    Highest education level: Not on file   Occupational History    Not on file   Tobacco Use    Smoking status: Never    Smokeless tobacco: Never    Tobacco comments:     Never used vapor or e-cigs    Substance and Sexual Activity    Alcohol use: No    Drug use: No    Sexual activity: Not Currently     Partners: Male     Birth control/protection: I.U.D. Other Topics Concern    Not on file   Social History Narrative    Not on file     Social Determinants of Health     Financial Resource Strain: Not on file   Food Insecurity: Not on file   Transportation Needs: Not on file   Physical Activity: Not on file   Stress: Not on file   Social Connections: Not on file   Intimate Partner Violence: Not on file   Housing Stability: Not on file     Tobacco History:  reports that she has never smoked. She has never used smokeless tobacco.  Alcohol Abuse:  reports no history of alcohol use. Drug Abuse:  reports no history of drug use.     Patient Active Problem List   Diagnosis Code   (none) - all problems resolved or deleted     Family History   Problem Relation Age of Onset    Hypertension Mother     Breast Cancer Maternal Grandmother 54    Cancer Maternal Grandmother         Colon    Breast Cancer Paternal Grandmother 72    Cancer Paternal Grandfather 79        Liver       Review of Systems - History obtained from the patient  Constitutional: negative for weight loss, fever, night sweats  HEENT: negative for hearing loss, earache, congestion, snoring, sorethroat  CV: negative for chest pain, palpitations, edema  Resp: negative for cough, shortness of breath, wheezing  GI: negative for change in bowel habits, abdominal pain, black or bloody stools  : negative for frequency, dysuria, hematuria, vaginal discharge  MSK: negative for back pain, joint pain, muscle pain  Breast: negative for breast lumps, nipple discharge, galactorrhea  Skin :negative for itching, rash, hives  Neuro: negative for dizziness, headache, confusion, weakness  Psych: negative for anxiety, depression, change in mood  Heme/lymph: negative for bleeding, bruising, pallor    Physical Exam    Visit Vitals  /69   Pulse 66   Ht 5' 6\" (1.676 m)   Wt 250 lb (113.4 kg)   BMI 40.35 kg/m²       Constitutional  Appearance: well-nourished, well developed, alert, in no acute distress    HENT  Head and Face: appears normal    Neck  Inspection/Palpation: normal appearance, no masses or tenderness  Lymph Nodes: no lymphadenopathy present  Thyroid: gland size normal, nontender, no nodules or masses present on palpation    Chest  Respiratory Effort: breathing unlabored  Auscultation: normal breath sounds    Cardiovascular  Heart:   Auscultation: regular rate and rhythm without murmur    Breasts  Inspection of Breasts: breasts symmetrical, no skin changes, no discharge present, nipple appearance normal, no skin retraction present  Palpation of Breasts and Axillae: no masses present on palpation, no breast tenderness  Axillary Lymph Nodes: no lymphadenopathy present    Gastrointestinal  Abdominal Examination: abdomen non-tender to palpation, normal bowel sounds, no masses present  Liver and spleen: no hepatomegaly present, spleen not palpable  Hernias: no hernias identified    Genitourinary  External Genitalia: normal appearance for age, no discharge present, no tenderness present, no inflammatory lesions present, no masses present, no atrophy present  Vagina: normal vaginal vault without central or paravaginal defects, moderate yellow-white discharge present, no inflammatory lesions present, no masses present  Bladder: non-tender to palpation  Urethra: appears normal  Cervix: normal, IUD strings 3-3.5cm  Uterus: normal size, shape and consistency  Adnexa: no adnexal tenderness present, no adnexal masses present  Perineum: perineum within normal limits, no evidence of trauma, no rashes or skin lesions present  Anus: anus within normal limits, no hemorrhoids present  Inguinal Lymph Nodes: no lymphadenopathy present    Skin  General Inspection: no rash, no lesions identified    Neurologic/Psychiatric  Mental Status:  Orientation: grossly oriented to person, place and time  Mood and Affect: mood normal, affect appropriate        Assessment & Plan:  Routine gynecologic examination. Pap today. Mirena 5/21/18. Adv approved for 7yrs, anticipate will get approved for 8th year. Requests STD swab, no blood work. Mod d/c on exam -> nuswab plus  Her current medical status is satisfactory with no evidence of significant gynecologic issues. Counseled re: diet, exercise, healthy lifestyle  Return for yearly wellness visits  Gardisil counseling provided. Patient verbalized understanding    Orders Placed This Encounter    NUSWAB VAGINITIS PLUS ([facility])    PAP IG, RFX APTIMA HPV ASCUS ([id number])     Order Specific Question:   Pap Source? Answer:   Cervical and Endocervical     Order Specific Question:   Total Hysterectomy? Answer:   No     Order Specific Question:   Supracervical Hysterectomy? Answer:   No     Order Specific Question:   Post Menopausal?     Answer:   No     Order Specific Question:   Hormone Therapy? Answer:   No     Order Specific Question:   IUD? Answer:   Yes     Order Specific Question:   Abnormal Bleeding? Answer:   No     Order Specific Question:   Pregnant     Answer:   No     Order Specific Question:   Post Partum? Answer:    No

## 2022-07-22 ENCOUNTER — OFFICE VISIT (OUTPATIENT)
Dept: OBGYN CLINIC | Age: 27
End: 2022-07-22
Payer: COMMERCIAL

## 2022-07-22 VITALS
SYSTOLIC BLOOD PRESSURE: 101 MMHG | HEIGHT: 66 IN | WEIGHT: 250 LBS | DIASTOLIC BLOOD PRESSURE: 69 MMHG | BODY MASS INDEX: 40.18 KG/M2 | HEART RATE: 66 BPM

## 2022-07-22 DIAGNOSIS — N89.8 VAGINAL DISCHARGE: ICD-10-CM

## 2022-07-22 DIAGNOSIS — Z11.3 SCREEN FOR STD (SEXUALLY TRANSMITTED DISEASE): ICD-10-CM

## 2022-07-22 DIAGNOSIS — Z01.419 ENCOUNTER FOR WELL WOMAN EXAM: Primary | ICD-10-CM

## 2022-07-22 DIAGNOSIS — Z30.431 SURVEILLANCE OF (INTRAUTERINE) CONTRACEPTIVE DEVICE: ICD-10-CM

## 2022-07-22 DIAGNOSIS — Z12.4 SCREENING FOR CERVICAL CANCER: ICD-10-CM

## 2022-07-22 PROCEDURE — 99385 PREV VISIT NEW AGE 18-39: CPT | Performed by: OBSTETRICS & GYNECOLOGY

## 2022-07-26 LAB
A VAGINAE DNA VAG QL NAA+PROBE: NORMAL SCORE
BVAB2 DNA VAG QL NAA+PROBE: NORMAL SCORE
C ALBICANS DNA VAG QL NAA+PROBE: NEGATIVE
C GLABRATA DNA VAG QL NAA+PROBE: NEGATIVE
C TRACH DNA VAG QL NAA+PROBE: NEGATIVE
CYTOLOGIST CVX/VAG CYTO: NORMAL
CYTOLOGY CVX/VAG DOC CYTO: NORMAL
CYTOLOGY CVX/VAG DOC THIN PREP: NORMAL
CYTOLOGY HISTORY:: NORMAL
DX ICD CODE: NORMAL
LABCORP, 190119: NORMAL
Lab: NORMAL
Lab: NORMAL
MEGA1 DNA VAG QL NAA+PROBE: NORMAL SCORE
N GONORRHOEA DNA VAG QL NAA+PROBE: NEGATIVE
OTHER STN SPEC: NORMAL
SPECIMEN STATUS REPORT, ROLRST: NORMAL
STAT OF ADQ CVX/VAG CYTO-IMP: NORMAL
T VAGINALIS DNA VAG QL NAA+PROBE: NEGATIVE

## 2023-08-15 ENCOUNTER — OFFICE VISIT (OUTPATIENT)
Age: 28
End: 2023-08-15
Payer: COMMERCIAL

## 2023-08-15 VITALS
DIASTOLIC BLOOD PRESSURE: 72 MMHG | BODY MASS INDEX: 36.96 KG/M2 | WEIGHT: 229 LBS | HEART RATE: 84 BPM | SYSTOLIC BLOOD PRESSURE: 106 MMHG

## 2023-08-15 DIAGNOSIS — Z01.419 ENCOUNTER FOR WELL WOMAN EXAM: Primary | ICD-10-CM

## 2023-08-15 PROCEDURE — 99395 PREV VISIT EST AGE 18-39: CPT | Performed by: OBSTETRICS & GYNECOLOGY

## 2023-08-15 NOTE — PROGRESS NOTES
Noe Medel is a 32 y.o. female returns for an annual exam     Chief Complaint   Patient presents with    Annual Exam       No LMP recorded. (Menstrual status: IUD). Her periods are nonexistent in flow. Problems: no problems  Birth Control: IUD. Last Pap: normal obtained 7/2022. She does not have a history of HERMINIA 2, 3 or cervical cancer.    With regard to the Gardisil vaccine, she has not received it yet
lifestyle  Return for yearly wellness visits  Gardasil counseling provided  Patient verbalized understanding

## 2024-07-15 ENCOUNTER — PROCEDURE VISIT (OUTPATIENT)
Age: 29
End: 2024-07-15
Payer: COMMERCIAL

## 2024-07-15 VITALS
SYSTOLIC BLOOD PRESSURE: 99 MMHG | WEIGHT: 237 LBS | HEIGHT: 63 IN | DIASTOLIC BLOOD PRESSURE: 55 MMHG | HEART RATE: 79 BPM | BODY MASS INDEX: 41.99 KG/M2

## 2024-07-15 DIAGNOSIS — Z30.432 ENCOUNTER FOR IUD REMOVAL: Primary | ICD-10-CM

## 2024-07-15 PROCEDURE — 58301 REMOVE INTRAUTERINE DEVICE: CPT | Performed by: OBSTETRICS & GYNECOLOGY

## 2024-07-15 SDOH — ECONOMIC STABILITY: FOOD INSECURITY: WITHIN THE PAST 12 MONTHS, THE FOOD YOU BOUGHT JUST DIDN'T LAST AND YOU DIDN'T HAVE MONEY TO GET MORE.: NEVER TRUE

## 2024-07-15 SDOH — ECONOMIC STABILITY: HOUSING INSECURITY
IN THE LAST 12 MONTHS, WAS THERE A TIME WHEN YOU DID NOT HAVE A STEADY PLACE TO SLEEP OR SLEPT IN A SHELTER (INCLUDING NOW)?: NO

## 2024-07-15 SDOH — ECONOMIC STABILITY: FOOD INSECURITY: WITHIN THE PAST 12 MONTHS, YOU WORRIED THAT YOUR FOOD WOULD RUN OUT BEFORE YOU GOT MONEY TO BUY MORE.: NEVER TRUE

## 2024-07-15 SDOH — ECONOMIC STABILITY: INCOME INSECURITY: HOW HARD IS IT FOR YOU TO PAY FOR THE VERY BASICS LIKE FOOD, HOUSING, MEDICAL CARE, AND HEATING?: NOT HARD AT ALL

## 2024-07-15 ASSESSMENT — PATIENT HEALTH QUESTIONNAIRE - PHQ9
SUM OF ALL RESPONSES TO PHQ QUESTIONS 1-9: 0
SUM OF ALL RESPONSES TO PHQ9 QUESTIONS 1 & 2: 0
SUM OF ALL RESPONSES TO PHQ QUESTIONS 1-9: 0
2. FEELING DOWN, DEPRESSED OR HOPELESS: NOT AT ALL
SUM OF ALL RESPONSES TO PHQ QUESTIONS 1-9: 0
SUM OF ALL RESPONSES TO PHQ QUESTIONS 1-9: 0
1. LITTLE INTEREST OR PLEASURE IN DOING THINGS: NOT AT ALL

## 2024-07-15 NOTE — PROGRESS NOTES
IUD REMOVAL  Indications for Removal:  Nithya Daniels is a ,  28 y.o. female White (non-) whose No LMP recorded. (Menstrual status: IUD).  . who presents today for IUD removal. Her current IUD was placed 2018. She has not had  problems with the IUD.  She requests removal of the IUD because she wants to try to conceive. The IUD removal procedure was discussed with the patient and she had no further questions.   Procedure: The patient was placed in a dorsal lithotomy position.  A speculum exam was performed and the cervix was visualized. The cervix was prepped with zephiran solution. The IUD string was visualized. Using ring forceps , the string was grasped and the IUD removed intact. The IUD was shown to the patient.        Preconceptual counseling.  Rec MVI/PNV/folate (just started a vitamin last week).  Healthy life style, avoid T/E/D.  Up to date with vaccinations.  Address any dental issues prior to pregnancy.  Handouts given.

## 2024-07-15 NOTE — PROGRESS NOTES
Nithya Daniels is a 28 y.o. female presents for a problem visit.    Chief Complaint   Patient presents with    IUD Removal         No LMP recorded. (Menstrual status: IUD).    Birth Control: IUD.    Last Pap: normal obtained 7/22/22      Chart reviewed for the following:   XIOMARA BAILEY RN, have reviewed the History, Physical and updated the Allergic reactions for Nithya Daniels     TIME OUT performed immediately prior to start of procedure:   XIOMARA ABILEY RN, have performed the following reviews on Nithya Daniels prior to the start of the procedure:            * Patient was identified by name and date of birth   * Agreement on procedure being performed was verified  * Risks and Benefits explained to the patient  * Procedure site verified and marked as necessary  * Patient was positioned for comfort  * Consent was signed and verified     Time: 1445    Date of procedure: 7/15/2024    Procedure performed by:  Lisbeth Chavarria MD       Provider assisted by: Xiomara Haider RN      Patient assisted by: self    How tolerated by patient: tolerated the procedure well with no complications        Examination chaperoned by XIOMARA HAIDER RN.

## 2024-08-17 NOTE — PROGRESS NOTES
Nithya Daniels is a 28 y.o. female returns for an annual exam     Chief Complaint   Patient presents with    Annual Exam       No LMP recorded.  Her periods are spotting in flow and  happened right after IUD removal.  States has not had a \"real\" period yet .  She does not have dysmenorrhea.  Problems: no problems  Birth Control: none  Last Pap: normal obtained 7/22/22  She does not have a history of HERMINIA 2, 3 or cervical cancer.   With regard to the Gardisil vaccine, she has not received it yet    
developed, alert, in no acute distress    HENT  Head and Face: appears normal    Neck  Inspection/Palpation: normal appearance, no masses or tenderness  Lymph Nodes: no lymphadenopathy present  Thyroid: gland size normal, nontender, no nodules or masses present on palpation    Chest  Respiratory Effort: breathing unlabored  Auscultation: normal breath sounds    Cardiovascular  Heart:  Auscultation: regular rate and rhythm without murmur    Breasts  Inspection of Breasts: breasts symmetrical, no skin changes, no discharge present, nipple appearance normal, no skin retraction present  Palpation of Breasts and Axillae: no masses present on palpation, no breast tenderness  Axillary Lymph Nodes: no lymphadenopathy present    Gastrointestinal  Abdominal Examination: abdomen non-tender to palpation, normal bowel sounds, no masses present  Liver and spleen: no hepatomegaly present, spleen not palpable  Hernias: no hernias identified    Genitourinary  External Genitalia: normal appearance for age, no discharge present, no tenderness present, no inflammatory lesions present, no masses present, no atrophy present  Vagina: normal vaginal vault, no discharge present, no inflammatory lesions present, no masses present  Bladder: non-tender to palpation  Urethra: appears normal  Cervix: normal   Uterus: normal size, shape and consistency; NT; anteverted  Adnexa: no adnexal tenderness present, no adnexal masses present  Perineum: perineum within normal limits, no evidence of trauma, no rashes or skin lesions present  Anus: anus within normal limits, no hemorrhoids present  Inguinal Lymph Nodes: no lymphadenopathy present    Skin  General Inspection: no rash, no lesions identified    Neurologic/Psychiatric  Mental Status:  Orientation: grossly oriented to person, place and time  Mood and Affect: mood normal, affect appropriate    No results found for this visit on 08/19/24.      Assessment & Plan:  Routine gynecologic examination.

## 2024-08-19 ENCOUNTER — OFFICE VISIT (OUTPATIENT)
Age: 29
End: 2024-08-19
Payer: COMMERCIAL

## 2024-08-19 VITALS
DIASTOLIC BLOOD PRESSURE: 70 MMHG | SYSTOLIC BLOOD PRESSURE: 104 MMHG | WEIGHT: 242 LBS | HEART RATE: 76 BPM | BODY MASS INDEX: 42.87 KG/M2

## 2024-08-19 DIAGNOSIS — Z12.4 SCREENING FOR CERVICAL CANCER: ICD-10-CM

## 2024-08-19 DIAGNOSIS — Z01.419 ENCOUNTER FOR WELL WOMAN EXAM WITH ROUTINE GYNECOLOGICAL EXAM: Primary | ICD-10-CM

## 2024-08-19 PROCEDURE — 99395 PREV VISIT EST AGE 18-39: CPT | Performed by: OBSTETRICS & GYNECOLOGY

## 2024-08-23 LAB
CYTOLOGIST CVX/VAG CYTO: NORMAL
CYTOLOGY CVX/VAG DOC CYTO: NORMAL
CYTOLOGY CVX/VAG DOC THIN PREP: NORMAL
DX ICD CODE: NORMAL
HPV GENOTYPE REFLEX: NORMAL
HPV I/H RISK 4 DNA CVX QL PROBE+SIG AMP: NEGATIVE
Lab: NORMAL
OTHER STN SPEC: NORMAL
STAT OF ADQ CVX/VAG CYTO-IMP: NORMAL

## 2024-10-10 DIAGNOSIS — Z34.90 ENCOUNTER FOR SUPERVISION OF NORMAL PREGNANCY, ANTEPARTUM, UNSPECIFIED GRAVIDITY: Primary | ICD-10-CM

## 2024-10-14 ENCOUNTER — ROUTINE PRENATAL (OUTPATIENT)
Age: 29
End: 2024-10-14

## 2024-10-14 VITALS
BODY MASS INDEX: 42.52 KG/M2 | DIASTOLIC BLOOD PRESSURE: 72 MMHG | SYSTOLIC BLOOD PRESSURE: 118 MMHG | HEART RATE: 80 BPM | WEIGHT: 240 LBS | HEIGHT: 63 IN

## 2024-10-14 DIAGNOSIS — Z36.9 ENCOUNTER FOR ANTENATAL SCREENING OF MOTHER: Primary | ICD-10-CM

## 2024-10-14 DIAGNOSIS — Z11.3 SCREENING FOR VENEREAL DISEASE: ICD-10-CM

## 2024-10-14 PROCEDURE — 0501F PRENATAL FLOW SHEET: CPT | Performed by: STUDENT IN AN ORGANIZED HEALTH CARE EDUCATION/TRAINING PROGRAM

## 2024-10-14 RX ORDER — ONDANSETRON 4 MG/1
4 TABLET, ORALLY DISINTEGRATING ORAL EVERY 8 HOURS PRN
Qty: 30 TABLET | Refills: 3 | Status: SHIPPED | OUTPATIENT
Start: 2024-10-14

## 2024-10-14 RX ORDER — ESCITALOPRAM OXALATE 5 MG/1
5 TABLET ORAL DAILY
Qty: 90 TABLET | Refills: 2 | Status: SHIPPED | OUTPATIENT
Start: 2024-10-14

## 2024-10-14 RX ORDER — DOXYLAMINE SUCCINATE AND PYRIDOXINE HYDROCHLORIDE, DELAYED RELEASE TABLETS 10 MG/10 MG 10; 10 MG/1; MG/1
2 TABLET, DELAYED RELEASE ORAL NIGHTLY
Qty: 120 TABLET | Refills: 3 | Status: SHIPPED | OUTPATIENT
Start: 2024-10-14

## 2024-10-14 NOTE — PROGRESS NOTES
Nithya Daniels is a 28 y.o. female presents for a new pregnancy visit.    Chief Complaint   Patient presents with    Initial Prenatal Visit       Problems:  no    Patient's last menstrual period was 2024 (exact date).    Last Pap: see report obtained normal 2024.    LMP history:  The date of her LMP is 2024 certain.  Her last menstrual period was normal and lasted for 4 to 5 days. She was not on the pill at conception.     Based on her LMP, her EDC is 2025 and her EGA is 7 weeks,6 days. Her menstrual cycles are regular and occur approximately every 28 days and range from 3 to 5 days. .      Ultrasound data:  She had an ultrasound done by the ultrasound tech today which revealed a viable hernandez pregnancy with a gestational age of 7 weeks and 6 days giving an EDC of 2025.    Pregnancy symptoms:    Since her LMP she has experienced urinary frequency, breast tenderness, and nausea.   She has not been vomiting over the last few weeks.  Associated signs and symptoms which she denies: dysuria, discharge, vaginal bleeding.    She states she has gained weight:  Approximately 5 pounds over the last few weeks.    Relevant past pregnancy history:   She has the following pregnancy history:     She has no history of  delivery.    Relevant past medical history:(relevant to this pregnancy): noncontributory.      Her occupation is: mental health .       Examination chaperoned by Loreto Hoover MA.

## 2024-10-14 NOTE — PROGRESS NOTES
Current pregnancy history:    Nithya Daniels is a ,  28 y.o. female White (non-) Patient's last menstrual period was 2024 (exact date)..  She presents for the evaluation of amenorrhea and a positive pregnancy test.    Per nursing Note:  Patient's last menstrual period was 2024 (exact date).     Last Pap: see report obtained normal 2024.     LMP history:  The date of her LMP is 2024 certain.  Her last menstrual period was normal and lasted for 4 to 5 days. She was not on the pill at conception.      Based on her LMP, her EDC is 2025 and her EGA is 7 weeks,6 days. Her menstrual cycles are regular and occur approximately every 28 days and range from 3 to 5 days. .      Ultrasound data:  She had an ultrasound done by the ultrasound tech today which revealed a viable hernandez pregnancy with a gestational age of 7 weeks and 6 days giving an EDC of 2025.     Pregnancy symptoms:     Since her LMP she has experienced urinary frequency, breast tenderness, and nausea.   She has not been vomiting over the last few weeks.  Associated signs and symptoms which she denies: dysuria, discharge, vaginal bleeding.     She states she has gained weight:  Approximately 5 pounds over the last few weeks.     Relevant past pregnancy history:              She has the following pregnancy history:                She has no history of  delivery.     Relevant past medical history:(relevant to this pregnancy): noncontributory.       Her occupation is: mental health    Substance history: negative for alcohol, tobacco and street drugs.           Positive for nothing.  Exposure history: There is/are no indoor cat/s in the home.  The patient was instructed to not change the cat litter.   She admits close contact with children on a regular basis.   She has had chicken pox or the vaccine in the past.   Patient denies issues with domestic violence.     Genetic Screening/Teratology Counseling:

## 2024-10-15 LAB
ABO GROUP BLD: NORMAL
BACTERIA UR CULT: NO GROWTH
BLD GP AB SCN SERPL QL: NEGATIVE
ERYTHROCYTE [DISTWIDTH] IN BLOOD BY AUTOMATED COUNT: 11.5 % (ref 11.7–15.4)
HBA1C MFR BLD: 4.9 % (ref 4.8–5.6)
HBV SURFACE AG SERPL QL IA: NEGATIVE
HCT VFR BLD AUTO: 40.1 % (ref 34–46.6)
HCV IGG SERPL QL IA: NON REACTIVE
HGB BLD-MCNC: 13.3 G/DL (ref 11.1–15.9)
HIV 1+2 AB+HIV1 P24 AG SERPL QL IA: NON REACTIVE
MCH RBC QN AUTO: 29.6 PG (ref 26.6–33)
MCHC RBC AUTO-ENTMCNC: 33.2 G/DL (ref 31.5–35.7)
MCV RBC AUTO: 89 FL (ref 79–97)
PLATELET # BLD AUTO: 280 X10E3/UL (ref 150–450)
RBC # BLD AUTO: 4.5 X10E6/UL (ref 3.77–5.28)
RH BLD: POSITIVE
RUBV IGG SERPL IA-ACNC: 1.26 INDEX
VZV IGG SER QL IA: REACTIVE
WBC # BLD AUTO: 10.2 X10E3/UL (ref 3.4–10.8)

## 2024-10-16 LAB
C TRACH RRNA SPEC QL NAA+PROBE: NEGATIVE
HGB A MFR BLD ELPH: 97.4 % (ref 96.4–98.8)
HGB A2 MFR BLD ELPH: 2.6 % (ref 1.8–3.2)
HGB F MFR BLD ELPH: 0 % (ref 0–2)
HGB FRACT BLD-IMP: NORMAL
HGB S MFR BLD ELPH: 0 %
N GONORRHOEA RRNA SPEC QL NAA+PROBE: NEGATIVE
T VAGINALIS RRNA SPEC QL NAA+PROBE: NEGATIVE
TREPONEMA PALLIDUM IGG+IGM AB [PRESENCE] IN SERUM OR PLASMA BY IMMUNOASSAY: NON REACTIVE

## 2024-11-11 ENCOUNTER — TELEPHONE (OUTPATIENT)
Age: 29
End: 2024-11-11

## 2024-11-11 ENCOUNTER — ROUTINE PRENATAL (OUTPATIENT)
Age: 29
End: 2024-11-11

## 2024-11-11 VITALS
SYSTOLIC BLOOD PRESSURE: 136 MMHG | BODY MASS INDEX: 41.98 KG/M2 | HEART RATE: 67 BPM | DIASTOLIC BLOOD PRESSURE: 72 MMHG | WEIGHT: 237 LBS

## 2024-11-11 DIAGNOSIS — Z36.9 ENCOUNTER FOR ANTENATAL SCREENING OF MOTHER: Primary | ICD-10-CM

## 2024-11-11 DIAGNOSIS — O99.210 OBESITY IN PREGNANCY: ICD-10-CM

## 2024-11-11 DIAGNOSIS — Z34.80 SUPERVISION OF OTHER NORMAL PREGNANCY, ANTEPARTUM: ICD-10-CM

## 2024-11-11 PROCEDURE — 0502F SUBSEQUENT PRENATAL CARE: CPT | Performed by: STUDENT IN AN ORGANIZED HEALTH CARE EDUCATION/TRAINING PROGRAM

## 2024-11-11 NOTE — PROGRESS NOTES
29yo  at 11w6d here for LILLIAN. No complaints. Nausea and anxiety improved. Taking lexapro. BP borderline today, advised to initiate asa for preE ppx. Nipt and horizon2 today (jimmy larger panel). RTC in 4w.

## 2024-11-18 LAB
Lab: NEGATIVE
Lab: NORMAL
Lab: NORMAL
NTRA CYSTIC FIBROSIS: NEGATIVE
NTRA SPINAL MUSCULAR ATROPHY: NEGATIVE

## 2024-12-09 ENCOUNTER — ROUTINE PRENATAL (OUTPATIENT)
Age: 29
End: 2024-12-09

## 2024-12-09 VITALS
DIASTOLIC BLOOD PRESSURE: 79 MMHG | WEIGHT: 242 LBS | SYSTOLIC BLOOD PRESSURE: 126 MMHG | HEART RATE: 85 BPM | BODY MASS INDEX: 42.87 KG/M2

## 2024-12-09 DIAGNOSIS — Z36.9 ENCOUNTER FOR ANTENATAL SCREENING OF MOTHER: Primary | ICD-10-CM

## 2024-12-09 DIAGNOSIS — Z34.80 SUPERVISION OF OTHER NORMAL PREGNANCY, ANTEPARTUM: ICD-10-CM

## 2024-12-09 PROCEDURE — 0502F SUBSEQUENT PRENATAL CARE: CPT | Performed by: STUDENT IN AN ORGANIZED HEALTH CARE EDUCATION/TRAINING PROGRAM

## 2024-12-09 NOTE — PROGRESS NOTES
28yo  at 15w6d here for QUETA Grady today. No complaints. Got  last month! RTC in 4w has mfm anatomy at that time.

## 2024-12-12 LAB
AFP INTERP SERPL-IMP: NORMAL
AFP INTERP SERPL-IMP: NORMAL
AFP MOM SERPL: 1.54
AFP SERPL-MCNC: 33.6 NG/ML
AGE AT DELIVERY: 31.5 YR
COMMENT: NORMAL
GA METHOD: NORMAL
GA: 15 WEEKS
IDDM PATIENT QL: NO
Lab: NORMAL
MULTIPLE PREGNANCY: NO
NEURAL TUBE DEFECT RISK FETUS: 2457 %

## 2025-01-06 ENCOUNTER — TELEPHONE (OUTPATIENT)
Age: 30
End: 2025-01-06

## 2025-01-06 NOTE — TELEPHONE ENCOUNTER
Patient appointment rescheduled from 01/07 due to inclement weather. Offered patient a sooner appointment. Patient agreed to a later appointment date of 01/20.

## 2025-01-06 NOTE — TELEPHONE ENCOUNTER
Left a V/M instructing the patient not to come to her appointment scheduled for tomorrow AM due to inclement weather. Provided the number for the patient to call to reschedule.

## 2025-01-07 ENCOUNTER — ROUTINE PRENATAL (OUTPATIENT)
Age: 30
End: 2025-01-07

## 2025-01-07 VITALS
WEIGHT: 246 LBS | BODY MASS INDEX: 43.58 KG/M2 | SYSTOLIC BLOOD PRESSURE: 124 MMHG | HEART RATE: 93 BPM | DIASTOLIC BLOOD PRESSURE: 81 MMHG

## 2025-01-07 DIAGNOSIS — Z34.80 SUPERVISION OF OTHER NORMAL PREGNANCY, ANTEPARTUM: ICD-10-CM

## 2025-01-07 PROCEDURE — 0502F SUBSEQUENT PRENATAL CARE: CPT | Performed by: STUDENT IN AN ORGANIZED HEALTH CARE EDUCATION/TRAINING PROGRAM

## 2025-01-07 NOTE — PROGRESS NOTES
30yo  at 20w0d here for LILLIAN. Feeling movement. No complaints. MFM appt rescheduled to  due to inclement weather. RTC in 4w. Plan for 1hr GTT at that time.

## 2025-01-20 ENCOUNTER — ROUTINE PRENATAL (OUTPATIENT)
Age: 30
End: 2025-01-20
Payer: COMMERCIAL

## 2025-01-20 VITALS — HEART RATE: 95 BPM | DIASTOLIC BLOOD PRESSURE: 64 MMHG | SYSTOLIC BLOOD PRESSURE: 96 MMHG

## 2025-01-20 DIAGNOSIS — Z3A.21 21 WEEKS GESTATION OF PREGNANCY: Primary | ICD-10-CM

## 2025-01-20 PROCEDURE — 76811 OB US DETAILED SNGL FETUS: CPT | Performed by: STUDENT IN AN ORGANIZED HEALTH CARE EDUCATION/TRAINING PROGRAM

## 2025-01-20 PROCEDURE — 99204 OFFICE O/P NEW MOD 45 MIN: CPT | Performed by: STUDENT IN AN ORGANIZED HEALTH CARE EDUCATION/TRAINING PROGRAM

## 2025-01-20 NOTE — PROGRESS NOTES
Patient was seen 1/20/2025      Please look under media to view full consult and ultrasound report in [employer].         Omayra Elise MD   Maternal Fetal Medicine

## 2025-01-20 NOTE — PROCEDURES
PATIENT: JUANA FROST   -  : 1995   -  DOS:2025   -  INTERPRETING PROVIDER:Omayra Elise,   Indication  ========    Anatomy, BMI 43    Method  ======    Transabdominal ultrasound examination. View: suboptimal due to maternal acoustic properties    Dating  ======    LMP on: 2024  Cycle: regular cycle  GA by LMP 21 w + 6 d  IGNACIO by LMP: 2025  Previous Ultrasound on: 10/14/2024  Type of prior assessment: GA  GA at prior assessment date 7 w + 6 d  GA by previous U/S 21 w + 6 d  IGNACIO by previous Ultrasound: 2025  Ultrasound examination on: 2025  GA by U/S based upon: AC, BPD, Femur, HC  GA by U/S 22 w + 2 d  IGNACIO by U/S: 2025  Assigned: based on the LMP, selected on 2025  Assigned GA 21 w + 6 d  Assigned IGNACIO: 2025    Fetal Growth Overview  =================    Exam date        GA              BPD (mm)          HC (mm)              AC (mm)               FL (mm)             HL (mm)        EFW (g)  2025        21w 6d        52.3     48%        196.1    36%        179.3     73%        38.8    61%                             512    77%    Fetal Biometry  ============    Standard  BPD 52.3 mm 21w 6d 48% Hadlock  OFD 70.5 mm 23w 4d 92% Abisai  .1 mm 21w 6d 36% Hadlock  Cerebellum tr 23.8 mm 21w 6d 74% Hill  Nuchal fold 5.0 mm  .3 mm 22w 6d 73% Hadlock  Femur 38.8 mm 22w 3d 61% Hadlock   g 22w 3d 77% Hadlock  EFW (lb) 1 lb  EFW (oz) 2 oz  EFW by: Hadlock (BPD-HC-AC-FL)  Extended   5.7 mm  CM 5.7 mm  59% Nicolaides  Nasal bone 6.5 mm  Rt Renal pelvis ap 2.5 mm  Head / Face / Neck  Nasal bone: present  Other Structures   bpm    General Evaluation  ==============    Cardiac activity present.  bpm. Fetal movements: visualized. Presentation: BREECH  Placenta: Placental site: right lateral. Placental edge-to-cervical os distance 5.5 cm  Umbilical cord: Cord vessels: 3 vessel cord. Insertion site: central  Amniotic fluid: Amount of AF: normal.

## 2025-02-03 SDOH — ECONOMIC STABILITY: INCOME INSECURITY: IN THE LAST 12 MONTHS, WAS THERE A TIME WHEN YOU WERE NOT ABLE TO PAY THE MORTGAGE OR RENT ON TIME?: NO

## 2025-02-03 SDOH — ECONOMIC STABILITY: FOOD INSECURITY: WITHIN THE PAST 12 MONTHS, THE FOOD YOU BOUGHT JUST DIDN'T LAST AND YOU DIDN'T HAVE MONEY TO GET MORE.: NEVER TRUE

## 2025-02-03 SDOH — ECONOMIC STABILITY: TRANSPORTATION INSECURITY
IN THE PAST 12 MONTHS, HAS LACK OF TRANSPORTATION KEPT YOU FROM MEETINGS, WORK, OR FROM GETTING THINGS NEEDED FOR DAILY LIVING?: NO

## 2025-02-03 SDOH — ECONOMIC STABILITY: FOOD INSECURITY: WITHIN THE PAST 12 MONTHS, YOU WORRIED THAT YOUR FOOD WOULD RUN OUT BEFORE YOU GOT MONEY TO BUY MORE.: NEVER TRUE

## 2025-02-03 SDOH — ECONOMIC STABILITY: TRANSPORTATION INSECURITY
IN THE PAST 12 MONTHS, HAS THE LACK OF TRANSPORTATION KEPT YOU FROM MEDICAL APPOINTMENTS OR FROM GETTING MEDICATIONS?: NO

## 2025-02-04 ENCOUNTER — ROUTINE PRENATAL (OUTPATIENT)
Age: 30
End: 2025-02-04

## 2025-02-04 VITALS
WEIGHT: 255 LBS | DIASTOLIC BLOOD PRESSURE: 76 MMHG | BODY MASS INDEX: 45.17 KG/M2 | HEART RATE: 90 BPM | SYSTOLIC BLOOD PRESSURE: 115 MMHG

## 2025-02-04 DIAGNOSIS — Z36.9 ENCOUNTER FOR ANTENATAL SCREENING OF MOTHER: Primary | ICD-10-CM

## 2025-02-04 DIAGNOSIS — Z34.80 SUPERVISION OF OTHER NORMAL PREGNANCY, ANTEPARTUM: ICD-10-CM

## 2025-02-04 DIAGNOSIS — Z23 NEED FOR VACCINATION: ICD-10-CM

## 2025-02-04 PROCEDURE — 0502F SUBSEQUENT PRENATAL CARE: CPT | Performed by: STUDENT IN AN ORGANIZED HEALTH CARE EDUCATION/TRAINING PROGRAM

## 2025-02-04 NOTE — PROGRESS NOTES
30yo  at 24w0d here for LILLIAN. 1hr GTT today, signed delivery consent. No complaints. RTC in 4w. Tdap at that time.

## 2025-02-05 LAB
ERYTHROCYTE [DISTWIDTH] IN BLOOD BY AUTOMATED COUNT: 12.3 % (ref 11.7–15.4)
GLUCOSE 1H P 50 G GLC PO SERPL-MCNC: 103 MG/DL (ref 70–139)
HCT VFR BLD AUTO: 35.3 % (ref 34–46.6)
HGB BLD-MCNC: 11.7 G/DL (ref 11.1–15.9)
MCH RBC QN AUTO: 29.7 PG (ref 26.6–33)
MCHC RBC AUTO-ENTMCNC: 33.1 G/DL (ref 31.5–35.7)
MCV RBC AUTO: 90 FL (ref 79–97)
PLATELET # BLD AUTO: 238 X10E3/UL (ref 150–450)
RBC # BLD AUTO: 3.94 X10E6/UL (ref 3.77–5.28)
WBC # BLD AUTO: 11.2 X10E3/UL (ref 3.4–10.8)

## 2025-02-06 LAB — TREPONEMA PALLIDUM IGG+IGM AB [PRESENCE] IN SERUM OR PLASMA BY IMMUNOASSAY: NON REACTIVE

## 2025-02-17 ENCOUNTER — ROUTINE PRENATAL (OUTPATIENT)
Age: 30
End: 2025-02-17
Payer: COMMERCIAL

## 2025-02-17 VITALS — HEART RATE: 96 BPM | SYSTOLIC BLOOD PRESSURE: 105 MMHG | DIASTOLIC BLOOD PRESSURE: 69 MMHG

## 2025-02-17 DIAGNOSIS — Z3A.25 25 WEEKS GESTATION OF PREGNANCY: Primary | ICD-10-CM

## 2025-02-17 PROCEDURE — 76816 OB US FOLLOW-UP PER FETUS: CPT | Performed by: STUDENT IN AN ORGANIZED HEALTH CARE EDUCATION/TRAINING PROGRAM

## 2025-02-17 PROCEDURE — 99213 OFFICE O/P EST LOW 20 MIN: CPT | Performed by: STUDENT IN AN ORGANIZED HEALTH CARE EDUCATION/TRAINING PROGRAM

## 2025-02-17 RX ORDER — ASPIRIN 81 MG/1
81 TABLET, CHEWABLE ORAL DAILY
COMMUNITY

## 2025-02-17 NOTE — PROGRESS NOTES
Please see ultrasound report under Imaging tab or Media tab.  Denise Rain MD  [facility]

## 2025-02-17 NOTE — PROCEDURES
PATIENT: JUANA FROST   -  : 1995   -  DOS:2025   -  INTERPRETING PROVIDER:Denise Rain,   Indication  ========    Suboptimal Anatomy, Obesity in pregnancy    Method  ======    Transabdominal ultrasound examination. View: suboptimal due to unfavorable fetal position    Pregnancy  =========    Green pregnancy. Number of fetuses: 1    Dating  ======    LMP on: 2024  Cycle: regular cycle  GA by LMP 25 w + 6 d  IGNACIO by LMP: 2025  Previous Ultrasound on: 10/14/2024  Type of prior assessment: GA  GA at prior assessment date 7 w + 6 d  GA by previous U/S 25 w + 6 d  IGNACIO by previous Ultrasound: 2025  Ultrasound examination on: 2025  GA by U/S based upon: AC, BPD, Femur, HC  GA by U/S 26 w + 4 d  IGNACIO by U/S: 2025  Assigned: based on the LMP, selected on 2025  Assigned GA 25 w + 6 d  Assigned IGNACIO: 2025    Fetal Biometry  ============    Standard  BPD 62.7 mm 25w 3d 26% Hadlock  OFD 86.0 mm 27w 5d 94% Abisai  .0 mm 26w 0d 29% Hadlock  .2 mm 28w 1d 95% Hadlock  Femur 49.7 mm 26w 5d 65% Hadlock  EFW 1,051 g 27w 0d 91% Hadlock  EFW (lb) 2 lb  EFW (oz) 5 oz  EFW by: Hadlock (BPD-HC-AC-FL)  Other Structures   bpm    General Evaluation  ==============    Cardiac activity present.  bpm. Fetal movements: visualized. Presentation: Cephalic  Placenta: Placental site: anterior with posterior accessory lobe  Umbilical cord: Cord vessels: 3 vessel cord. Insertion site: central  Amniotic fluid: Amount of AF: normal. MVP 6.8 cm    Fetal Anatomy  ===========    Lips: SUBOPTIMAL  Nose: SUBOPTIMAL  Face  Palate: normal  4-chamber view: normal  RVOT view: normal  LVOT view: normal  3-vessel-trachea view: normal  Heart / Thorax  Aortic arch view: SUBOPTIMAL  Ductal arch view: NOT VISUALIZED  Stomach: normal  Kidneys: normal  Bladder: normal  Sacral spine: normal  Rt fingers: normal  Lt fingers: normal  Wants to know fetal sex: yes    Maternal

## 2025-03-04 ENCOUNTER — ROUTINE PRENATAL (OUTPATIENT)
Age: 30
End: 2025-03-04
Payer: COMMERCIAL

## 2025-03-04 VITALS
HEART RATE: 92 BPM | DIASTOLIC BLOOD PRESSURE: 70 MMHG | WEIGHT: 255 LBS | SYSTOLIC BLOOD PRESSURE: 108 MMHG | BODY MASS INDEX: 45.17 KG/M2

## 2025-03-04 DIAGNOSIS — F41.9 ANXIETY: ICD-10-CM

## 2025-03-04 DIAGNOSIS — Z34.80 SUPERVISION OF OTHER NORMAL PREGNANCY, ANTEPARTUM: ICD-10-CM

## 2025-03-04 DIAGNOSIS — Z23 ENCOUNTER FOR IMMUNIZATION: ICD-10-CM

## 2025-03-04 DIAGNOSIS — Z36.9 ENCOUNTER FOR ANTENATAL SCREENING OF MOTHER: Primary | ICD-10-CM

## 2025-03-04 PROCEDURE — 90715 TDAP VACCINE 7 YRS/> IM: CPT | Performed by: STUDENT IN AN ORGANIZED HEALTH CARE EDUCATION/TRAINING PROGRAM

## 2025-03-04 PROCEDURE — 90471 IMMUNIZATION ADMIN: CPT | Performed by: STUDENT IN AN ORGANIZED HEALTH CARE EDUCATION/TRAINING PROGRAM

## 2025-03-04 PROCEDURE — 0502F SUBSEQUENT PRENATAL CARE: CPT | Performed by: STUDENT IN AN ORGANIZED HEALTH CARE EDUCATION/TRAINING PROGRAM

## 2025-03-04 RX ORDER — ESCITALOPRAM OXALATE 10 MG/1
10 TABLET ORAL DAILY
Qty: 90 TABLET | Refills: 1 | Status: SHIPPED | OUTPATIENT
Start: 2025-03-04

## 2025-03-04 ASSESSMENT — PATIENT HEALTH QUESTIONNAIRE - PHQ9
SUM OF ALL RESPONSES TO PHQ QUESTIONS 1-9: 0
2. FEELING DOWN, DEPRESSED OR HOPELESS: NOT AT ALL
1. LITTLE INTEREST OR PLEASURE IN DOING THINGS: NOT AT ALL
SUM OF ALL RESPONSES TO PHQ QUESTIONS 1-9: 0

## 2025-03-04 NOTE — PROGRESS NOTES
28yo  at 28w0d here for LILLIAN. No ob complaints. Met with MFM last month, bilobed placenta. RTC in 2w. Tdap today.

## 2025-03-04 NOTE — PROGRESS NOTES
After obtaining consent, and per orders of Dr Pereyra, injection of Tdap given in right deltoid by Loreto Hoover MA. Patient instructed to remain in clinic for 20 minutes afterwards, and to report any adverse reaction to me immediately. Lot: [id number] Exp: 4/19/2027 NDC: 70254-769-56  , VIS given.

## 2025-03-14 DIAGNOSIS — Z34.90 PREGNANCY, UNSPECIFIED GESTATIONAL AGE: Primary | ICD-10-CM

## 2025-03-17 ENCOUNTER — ROUTINE PRENATAL (OUTPATIENT)
Age: 30
End: 2025-03-17
Payer: COMMERCIAL

## 2025-03-17 ENCOUNTER — ROUTINE PRENATAL (OUTPATIENT)
Age: 30
End: 2025-03-17

## 2025-03-17 VITALS — SYSTOLIC BLOOD PRESSURE: 97 MMHG | DIASTOLIC BLOOD PRESSURE: 60 MMHG | HEART RATE: 97 BPM

## 2025-03-17 VITALS
SYSTOLIC BLOOD PRESSURE: 115 MMHG | WEIGHT: 258 LBS | BODY MASS INDEX: 45.7 KG/M2 | DIASTOLIC BLOOD PRESSURE: 74 MMHG | HEART RATE: 90 BPM

## 2025-03-17 DIAGNOSIS — O99.210 OBESITY AFFECTING PREGNANCY, ANTEPARTUM, UNSPECIFIED OBESITY TYPE: Primary | ICD-10-CM

## 2025-03-17 DIAGNOSIS — Z34.90 PREGNANCY, UNSPECIFIED GESTATIONAL AGE: ICD-10-CM

## 2025-03-17 DIAGNOSIS — O43.199 BILOBED PLACENTA: ICD-10-CM

## 2025-03-17 DIAGNOSIS — Z34.80 SUPERVISION OF OTHER NORMAL PREGNANCY, ANTEPARTUM: Primary | ICD-10-CM

## 2025-03-17 PROCEDURE — 76816 OB US FOLLOW-UP PER FETUS: CPT | Performed by: STUDENT IN AN ORGANIZED HEALTH CARE EDUCATION/TRAINING PROGRAM

## 2025-03-17 PROCEDURE — 99214 OFFICE O/P EST MOD 30 MIN: CPT

## 2025-03-17 PROCEDURE — 0502F SUBSEQUENT PRENATAL CARE: CPT | Performed by: STUDENT IN AN ORGANIZED HEALTH CARE EDUCATION/TRAINING PROGRAM

## 2025-03-17 NOTE — PROCEDURES
PATIENT: JUANA FROST   -  : 1995   -  DOS:2025   -  INTERPRETING PROVIDER:Omayra Elise,   Indication  ========    Suboptimal Anatomy, Obesity in pregnancy    Method  ======    Transabdominal ultrasound examination. View: Sufficient    Pregnancy  =========    Green pregnancy. Number of fetuses: 1    Dating  ======    LMP on: 2024  Cycle: regular cycle  GA by LMP 29 w + 6 d  IGNACIO by LMP: 2025  Previous Ultrasound on: 10/14/2024  Type of prior assessment: GA  GA at prior assessment date 7 w + 6 d  GA by previous U/S 29 w + 6 d  IGNACIO by previous Ultrasound: 2025  Ultrasound examination on: 3/17/2025  GA by U/S based upon: AC, BPD, Femur, HC  GA by U/S 31 w + 4 d  IGNACIO by U/S: 5/15/2025  Assigned: based on the LMP, selected on 2025  Assigned GA 29 w + 6 d  Assigned IGNACIO: 2025    Fetal Biometry  ============    Standard  BPD 77.1 mm 31w 0d 72% Hadlock  .3 mm 34w 5d >99% Abisai  .2 mm 32w 2d 82% Hadlock  .1 mm 31w 1d 82% Hadlock  Femur 60.7 mm 31w 4d 81% Hadlock  EFW 1,756 g 31w 0d 86% Hadlock  EFW (lb) 3 lb  EFW (oz) 14 oz  EFW by: Hadlock (BPD-HC-AC-FL)  Other Structures   bpm    General Evaluation  ==============    Cardiac activity present.  bpm. Fetal movements: visualized. Presentation: Cephalic  Placenta: Placental site: anterior with posterior accessory lobe  Umbilical cord: Cord vessels: 3 vessel cord. Insertion site: central  Amniotic fluid: Amount of AF: normal. MVP 6.0 cm. LETY 20.4 cm. Q1 5.5 cm, Q2 6.0 cm, Q3 4.7 cm, Q4 4.2 cm    Fetal Anatomy  ===========    Lips: normal  Nose: normal  Heart / Thorax  Aortic arch view: normal  Ductal arch view: normal  Stomach: normal  Kidneys: normal  Bladder: normal  Wants to know fetal sex: yes    Findings  =======    Intrauterine Green pregnancy at 29w 6d by clinical dates.  EFW is 1756 g at 86%, abdominal circumference at 82%.  Amniotic fluid: normal.  Placenta is anterior with posterior

## 2025-03-17 NOTE — PROGRESS NOTES
Assessment & Plan   ASSESSMENT/PLAN:  1. Obesity affecting pregnancy, antepartum, unspecified obesity type  2. Bilobed placenta    JUANA is 29 yrs of age,  at 29w 6d.      AGA growth (approaching LGA) and normal AFV today.      Maternal Obesity (Pre-Pregnancy BMI>40):   - Previously counseled.   - ACOG recommends a weight gain between 11-20 pounds for all classes of obesity.   - Recommend baseline labs: A1C, CBC, CMP> CBC wnl, no A1C or CMP noted.   - 2025 1 hr 103- passed  - Growth approaching LGA> Prior Vag-Spont delivery 9lb 10oz infant without complications.   - Continue ldASA for preeclampsia prophylaxis. Can discontinue in the postpartum period, no known NSAID allergy.   - Consider anesthesia consult in third trimester   - Recommend serial growth cans q4 weeks starting at 28 weeks  -  testing weekly starting at 34 weeks   - Delivery: 39.0-39.6   - Kick count instructions, PIH and PTL precautions reviewed.      Bilobed placenta:   - May be sonographically seen as two separate placental discs of nearly equal size. The cord attaches to the larger lobe centrally; no vasa previa or placenta previa noted  - serial growth scans   - evaluate placenta after delivery to ensure all lobes removed      NIPT: normal male (lamonte)  CF/SMA neg (lamonte )  msAFP neg  Hemo elec WNL     Recommendations  Labs: CMP, PC Ratio, A1C     Return in 4 weeks for Growth     - Recommend serial growth scans q4 weeks starting at 28 weeks  -  testing weekly starting at 34 weeks   - Delivery: 39.0-39.6     Please see Viewpoint for ultrasound findings.     Subjective   Juana Daniels (:  1995) is a 29 y.o. female,Established patient, here for evaluation of the following chief complaint(s):  1. Obesity affecting pregnancy, antepartum, unspecified obesity type  2. Bilobed placenta    Objective   Physical Exam  Vitals reviewed.   Constitutional:       Appearance: Normal appearance.   Neurological:

## 2025-03-17 NOTE — PROGRESS NOTES
Patient was seen 3/17/2025      Please look under media to view full consult and ultrasound report in ViewPoint.         Omayra Elise MD   Maternal Fetal Medicine

## 2025-03-17 NOTE — PROGRESS NOTES
28yo  at 29w6d here for LILLIAN. No complaints. MFM visit this AM, report pending, reports normal findings. Scheduled for 39w IOL . RTC in 2w.

## 2025-03-31 ENCOUNTER — ROUTINE PRENATAL (OUTPATIENT)
Age: 30
End: 2025-03-31

## 2025-03-31 VITALS
WEIGHT: 260 LBS | SYSTOLIC BLOOD PRESSURE: 117 MMHG | HEART RATE: 93 BPM | BODY MASS INDEX: 46.06 KG/M2 | DIASTOLIC BLOOD PRESSURE: 75 MMHG

## 2025-03-31 DIAGNOSIS — Z34.80 SUPERVISION OF OTHER NORMAL PREGNANCY, ANTEPARTUM: ICD-10-CM

## 2025-03-31 PROCEDURE — 0502F SUBSEQUENT PRENATAL CARE: CPT | Performed by: STUDENT IN AN ORGANIZED HEALTH CARE EDUCATION/TRAINING PROGRAM

## 2025-03-31 NOTE — PROGRESS NOTES
28yo  at 31w6d here for LILLIAN. Some swelling of ankles and hands. BL calves measuring 48-49cm circumference, no calf tenderness. Given precautions and discussed measures to minimize swelling. RTC in 2w.